# Patient Record
Sex: MALE | Race: WHITE | NOT HISPANIC OR LATINO | URBAN - METROPOLITAN AREA
[De-identification: names, ages, dates, MRNs, and addresses within clinical notes are randomized per-mention and may not be internally consistent; named-entity substitution may affect disease eponyms.]

---

## 2018-07-14 ENCOUNTER — INPATIENT (INPATIENT)
Facility: HOSPITAL | Age: 58
LOS: 2 days | Discharge: HOME | End: 2018-07-17
Attending: INTERNAL MEDICINE | Admitting: INTERNAL MEDICINE

## 2018-07-14 VITALS
HEART RATE: 103 BPM | TEMPERATURE: 98 F | DIASTOLIC BLOOD PRESSURE: 83 MMHG | OXYGEN SATURATION: 100 % | RESPIRATION RATE: 20 BRPM | SYSTOLIC BLOOD PRESSURE: 135 MMHG

## 2018-07-14 DIAGNOSIS — I10 ESSENTIAL (PRIMARY) HYPERTENSION: ICD-10-CM

## 2018-07-14 DIAGNOSIS — F10.11 ALCOHOL ABUSE, IN REMISSION: ICD-10-CM

## 2018-07-14 DIAGNOSIS — D50.9 IRON DEFICIENCY ANEMIA, UNSPECIFIED: ICD-10-CM

## 2018-07-14 DIAGNOSIS — I25.2 OLD MYOCARDIAL INFARCTION: ICD-10-CM

## 2018-07-14 DIAGNOSIS — K85.90 ACUTE PANCREATITIS WITHOUT NECROSIS OR INFECTION, UNSPECIFIED: ICD-10-CM

## 2018-07-14 DIAGNOSIS — I25.10 ATHEROSCLEROTIC HEART DISEASE OF NATIVE CORONARY ARTERY WITHOUT ANGINA PECTORIS: ICD-10-CM

## 2018-07-14 DIAGNOSIS — F17.210 NICOTINE DEPENDENCE, CIGARETTES, UNCOMPLICATED: ICD-10-CM

## 2018-07-14 DIAGNOSIS — E78.5 HYPERLIPIDEMIA, UNSPECIFIED: ICD-10-CM

## 2018-07-14 DIAGNOSIS — K86.3 PSEUDOCYST OF PANCREAS: ICD-10-CM

## 2018-07-14 DIAGNOSIS — Z95.5 PRESENCE OF CORONARY ANGIOPLASTY IMPLANT AND GRAFT: ICD-10-CM

## 2018-07-14 LAB
ALBUMIN SERPL ELPH-MCNC: 3.7 G/DL — SIGNIFICANT CHANGE UP (ref 3.5–5.2)
ALP SERPL-CCNC: 111 U/L — SIGNIFICANT CHANGE UP (ref 30–115)
ALT FLD-CCNC: 10 U/L — SIGNIFICANT CHANGE UP (ref 0–41)
ANION GAP SERPL CALC-SCNC: 18 MMOL/L — HIGH (ref 7–14)
AST SERPL-CCNC: 19 U/L — SIGNIFICANT CHANGE UP (ref 0–41)
BASOPHILS # BLD AUTO: 0.02 K/UL — SIGNIFICANT CHANGE UP (ref 0–0.2)
BASOPHILS NFR BLD AUTO: 0.2 % — SIGNIFICANT CHANGE UP (ref 0–1)
BILIRUB SERPL-MCNC: 0.8 MG/DL — SIGNIFICANT CHANGE UP (ref 0.2–1.2)
BUN SERPL-MCNC: 9 MG/DL — LOW (ref 10–20)
CALCIUM SERPL-MCNC: 9.2 MG/DL — SIGNIFICANT CHANGE UP (ref 8.5–10.1)
CHLORIDE SERPL-SCNC: 94 MMOL/L — LOW (ref 98–110)
CK SERPL-CCNC: 46 U/L — SIGNIFICANT CHANGE UP (ref 0–225)
CO2 SERPL-SCNC: 24 MMOL/L — SIGNIFICANT CHANGE UP (ref 17–32)
CREAT SERPL-MCNC: 0.6 MG/DL — LOW (ref 0.7–1.5)
EOSINOPHIL # BLD AUTO: 0.16 K/UL — SIGNIFICANT CHANGE UP (ref 0–0.7)
EOSINOPHIL NFR BLD AUTO: 1.9 % — SIGNIFICANT CHANGE UP (ref 0–8)
GLUCOSE SERPL-MCNC: 102 MG/DL — HIGH (ref 70–99)
HCT VFR BLD CALC: 30.1 % — LOW (ref 42–52)
HGB BLD-MCNC: 8.9 G/DL — LOW (ref 14–18)
IMM GRANULOCYTES NFR BLD AUTO: 0.6 % — HIGH (ref 0.1–0.3)
LACTATE SERPL-SCNC: 0.8 MMOL/L — SIGNIFICANT CHANGE UP (ref 0.5–2.2)
LIDOCAIN IGE QN: >600 U/L — HIGH (ref 7–60)
LYMPHOCYTES # BLD AUTO: 0.89 K/UL — LOW (ref 1.2–3.4)
LYMPHOCYTES # BLD AUTO: 10.4 % — LOW (ref 20.5–51.1)
MCHC RBC-ENTMCNC: 21.3 PG — LOW (ref 27–31)
MCHC RBC-ENTMCNC: 29.6 G/DL — LOW (ref 32–37)
MCV RBC AUTO: 72.2 FL — LOW (ref 80–94)
MONOCYTES # BLD AUTO: 1.02 K/UL — HIGH (ref 0.1–0.6)
MONOCYTES NFR BLD AUTO: 11.9 % — HIGH (ref 1.7–9.3)
NEUTROPHILS # BLD AUTO: 6.42 K/UL — SIGNIFICANT CHANGE UP (ref 1.4–6.5)
NEUTROPHILS NFR BLD AUTO: 75 % — SIGNIFICANT CHANGE UP (ref 42.2–75.2)
PLATELET # BLD AUTO: 293 K/UL — SIGNIFICANT CHANGE UP (ref 130–400)
POTASSIUM SERPL-MCNC: 4.5 MMOL/L — SIGNIFICANT CHANGE UP (ref 3.5–5)
POTASSIUM SERPL-SCNC: 4.5 MMOL/L — SIGNIFICANT CHANGE UP (ref 3.5–5)
PROT SERPL-MCNC: 6.7 G/DL — SIGNIFICANT CHANGE UP (ref 6–8)
RBC # BLD: 4.17 M/UL — LOW (ref 4.7–6.1)
RBC # FLD: 22.5 % — HIGH (ref 11.5–14.5)
SODIUM SERPL-SCNC: 136 MMOL/L — SIGNIFICANT CHANGE UP (ref 135–146)
TROPONIN T SERPL-MCNC: <0.01 NG/ML — SIGNIFICANT CHANGE UP
WBC # BLD: 8.56 K/UL — SIGNIFICANT CHANGE UP (ref 4.8–10.8)
WBC # FLD AUTO: 8.56 K/UL — SIGNIFICANT CHANGE UP (ref 4.8–10.8)

## 2018-07-14 RX ORDER — METHOCARBAMOL 500 MG/1
750 TABLET, FILM COATED ORAL ONCE
Qty: 0 | Refills: 0 | Status: COMPLETED | OUTPATIENT
Start: 2018-07-14 | End: 2018-07-14

## 2018-07-14 RX ORDER — MORPHINE SULFATE 50 MG/1
4 CAPSULE, EXTENDED RELEASE ORAL ONCE
Qty: 0 | Refills: 0 | Status: DISCONTINUED | OUTPATIENT
Start: 2018-07-14 | End: 2018-07-14

## 2018-07-14 RX ORDER — MORPHINE SULFATE 50 MG/1
6 CAPSULE, EXTENDED RELEASE ORAL ONCE
Qty: 0 | Refills: 0 | Status: DISCONTINUED | OUTPATIENT
Start: 2018-07-14 | End: 2018-07-14

## 2018-07-14 RX ORDER — ATORVASTATIN CALCIUM 80 MG/1
1 TABLET, FILM COATED ORAL
Qty: 0 | Refills: 0 | COMMUNITY

## 2018-07-14 RX ORDER — PIPERACILLIN AND TAZOBACTAM 4; .5 G/20ML; G/20ML
3.38 INJECTION, POWDER, LYOPHILIZED, FOR SOLUTION INTRAVENOUS ONCE
Qty: 0 | Refills: 0 | Status: COMPLETED | OUTPATIENT
Start: 2018-07-14 | End: 2018-07-14

## 2018-07-14 RX ORDER — SODIUM CHLORIDE 9 MG/ML
1000 INJECTION, SOLUTION INTRAVENOUS ONCE
Qty: 0 | Refills: 0 | Status: COMPLETED | OUTPATIENT
Start: 2018-07-14 | End: 2018-07-14

## 2018-07-14 RX ORDER — SODIUM CHLORIDE 9 MG/ML
1000 INJECTION, SOLUTION INTRAVENOUS
Qty: 0 | Refills: 0 | Status: DISCONTINUED | OUTPATIENT
Start: 2018-07-14 | End: 2018-07-15

## 2018-07-14 RX ADMIN — METHOCARBAMOL 750 MILLIGRAM(S): 500 TABLET, FILM COATED ORAL at 17:12

## 2018-07-14 RX ADMIN — SODIUM CHLORIDE 1000 MILLILITER(S): 9 INJECTION, SOLUTION INTRAVENOUS at 17:12

## 2018-07-14 RX ADMIN — MORPHINE SULFATE 4 MILLIGRAM(S): 50 CAPSULE, EXTENDED RELEASE ORAL at 17:12

## 2018-07-14 RX ADMIN — MORPHINE SULFATE 6 MILLIGRAM(S): 50 CAPSULE, EXTENDED RELEASE ORAL at 23:30

## 2018-07-14 RX ADMIN — PIPERACILLIN AND TAZOBACTAM 200 GRAM(S): 4; .5 INJECTION, POWDER, LYOPHILIZED, FOR SOLUTION INTRAVENOUS at 23:39

## 2018-07-14 RX ADMIN — MORPHINE SULFATE 4 MILLIGRAM(S): 50 CAPSULE, EXTENDED RELEASE ORAL at 23:40

## 2018-07-14 RX ADMIN — MORPHINE SULFATE 4 MILLIGRAM(S): 50 CAPSULE, EXTENDED RELEASE ORAL at 19:27

## 2018-07-14 NOTE — ED ADULT NURSE NOTE - CHIEF COMPLAINT QUOTE
Pt complaining of groin pain, abdominal pain,  back pain and shoulder pain. Pt states he was seen in [facility] in June and told he had a growth on pancreas and lung. Pt signed himself out and has not followed up since that time.

## 2018-07-14 NOTE — ED PROVIDER NOTE - PROGRESS NOTE DETAILS
s/o Dr. alcazar received signout from Dr. Morrissey - ct abdomen done- pending result; will continue to follow - anticipate admission for evaluation of elevated lipase/ pancreatic mass see at [facility]. case d/w with surgical resident Dr. Bruce - will see pt in Ed; RUQ ultrasound ordered, Gi consult placed. initial CT scan read, fat stranding and fluid around pancreas, multiple loculated abscesses.  pt given zosyn, surgery called to see pt.  seen by surgery, no acute intervention.  updated CT read now stating loculated fluid collections likely pseudocysts.  Repeat VS ok.  Case d/w ICU, Dr. Graham, states pt stable for floor at this time.

## 2018-07-14 NOTE — ED PROVIDER NOTE - MEDICAL DECISION MAKING DETAILS
pain persistent, elevated lipase, awaiting CT * see progress notes* pt in ER with pancreatitis, lactate ok, case d/w ICU, pt stable for benito.

## 2018-07-14 NOTE — ED ADULT NURSE NOTE - OBJECTIVE STATEMENT
pt c/o suprapubic, lower abdominal pain radiating to b/l groin/thighs x 4 days; as per pt pain worsened this morning, upon examination pt appears in most pain when RLQ palpated; pt has chronic back pain and difficulty lying flat. Pt recently diagnosed at [facility] with growth on pancreas and lung; pt has not followed up since.

## 2018-07-14 NOTE — ED PROVIDER NOTE - PHYSICAL EXAMINATION
CONSTITUTIONAL: Well-developed; well-nourished; in no acute distress.   SKIN: warm, dry  HEAD: Normocephalic; atraumatic.  EYES: PERRL, no conjunctival erythema  ENT: No nasal discharge; airway clear.  NECK: Supple; non tender.  CARD: S1, S2 normal; Regular rate and rhythm.   RESP: No wheezes, rales or rhonchi.  ABD: soft non distended, + tenderness in RLQ, no rebound or guarding.   : normal genital exam, no edema, errythema or tenderness. no hernia.   EXT: Normal ROM.   LYMPH: No acute cervical adenopathy.  NEURO: Alert, oriented, grossly unremarkable, 5/5 strength in all 4 extremities, equal sensation bilaterally

## 2018-07-14 NOTE — ED ADULT TRIAGE NOTE - CHIEF COMPLAINT QUOTE
Pt complaining of groin pain, abdominal pain,  back pain and shoulder pain Pt complaining of groin pain, abdominal pain,  back pain and shoulder pain. Pt states he was seen in [facility] in June and told he had a growth on pancreas and lung. Pt signed himself out and has not followed up since that time.

## 2018-07-14 NOTE — ED PROVIDER NOTE - OBJECTIVE STATEMENT
59 yo M pmh of recent MI one month ago with stent placement. ALso recently diagnosed with pancreatic and lung mass that have not been fully worked up. Was admitted to look into in at [facility] but signed out ama when he did not walk to wait an additional day for an MRI. States that since he continues to have abdominal and groin pain. Today he started having worse pain and now is unable to lie down flat. no cp, no sob, no n/v/d.

## 2018-07-14 NOTE — CONSULT NOTE ADULT - ASSESSMENT
A: 59 yo M pmh of HLD, HTN, alcohol abuse in the past, chronic pancreatitis and recent MI one month ago with stent placement. Also recently diagnosed with pancreatic and lung mass that have not been fully worked up. Was admitted to look into in at [facility] but signed out ama when he did not want to wait an additional day for an MRI. States that since then he continues to have abdominal. Today he started having worse pain that is constant and now is unable to lie down flat, no fever or chills. +nausea, no vomiting, no diarhea/constipation, endorses a recent 38 lb loss in the past month.     Plan:  -IV fluids  -pain control  -GI follow up A: 59 yo M pmh of HLD, HTN, alcohol abuse in the past, chronic pancreatitis and recent MI one month ago with stent placement. Also recently diagnosed with pancreatic and lung mass that have not been fully worked up. Was admitted to look into in at [facility] but signed out ama when he did not want to wait an additional day for an MRI. States that since then he continues to have abdominal. Today he started having worse pain that is constant and now is unable to lie down flat, no fever or chills. +nausea, no vomiting, no diarhea/constipation, endorses a recent 38 lb loss in the past month.     Plan:  -IV fluids  -pain control  -GI follow up  -f/u US abdomen 59 yo M with chronic pancreatitis, with likely multiple small pseudocysts     Plan:  -npo IV fluids  -pain control  - ct pancreatic protocol/ mrcp  -GI follow up for eus aspiration   -f/u US abdomen  - no role for acute surgical intervention   - will follow

## 2018-07-14 NOTE — ED PROVIDER NOTE - NS ED ROS FT
Eyes:  No visual changes, eye pain or discharge.  ENMT:   no sore throat or runny nose,   Cardiac:  No chest pain, SOB. recent cath procedure.   Respiratory:  No cough or respiratory distress.   GI:  No nausea, vomiting, diarrhea. + diffuse abdominal pain for few weeks that has worsened today.   :  No dysuria, frequency or burning.  MS:  No joint pain or back pain.  Neuro:  No headache or weakness.    Skin:  No skin rash.   Endocrine: No history of thyroid disease or diabetes.

## 2018-07-14 NOTE — ED PROVIDER NOTE - ATTENDING CONTRIBUTION TO CARE
58 M to ED with abd pain, pancreatic and lungmass recently diagnosed but left AMA before MRI done inpt at prev admission.     today pain persistent and unable to sit back so to ED for eval.   AVSS, exam as noted, CTAB, RRR, abdomen soft diffusely tender, (+) bowel sounds, neuro nonfocal

## 2018-07-14 NOTE — CONSULT NOTE ADULT - SUBJECTIVE AND OBJECTIVE BOX
59 yo M pmh of HLD, HTN, alcohol abuse in the past( states quit 2 years ago)chronic pancreatitis and recent MI one month ago with stent placement. Also recently diagnosed with pancreatic and lung mass that have not been fully worked up. Was admitted to look into in at [facility] but signed out ama when he did not want to wait an additional day for an MRI. States that since then he continues to have abdominal and groin pain. Today he started having worse pain that is constant and now is unable to lie down flat, pain doesn't radiate. No cp, no sob, no fever or chills. +nausea, no vomiting, no diarhea/constipation, endorses a recent 38 lb loss in the past month.     PE:    Vital Signs Last 24 Hrs  T(C): 36.8 (14 Jul 2018 22:22), Max: 36.9 (14 Jul 2018 15:03)  T(F): 98.2 (14 Jul 2018 22:22), Max: 98.4 (14 Jul 2018 15:03)  HR: 89 (14 Jul 2018 22:22) (89 - 103)  BP: 133/83 (14 Jul 2018 22:22) (133/83 - 135/83)  BP(mean): 104 (14 Jul 2018 22:22) (104 - 104)  RR: 20 (14 Jul 2018 22:22) (20 - 20)  SpO2: 100% (14 Jul 2018 22:22) (100% - 100%)    Labs;  Lipase, Serum (07.14.18 @ 16:01)    Lipase, Serum: >600 U/L  Lactate, Blood (07.14.18 @ 16:01)    Lactate, Blood: 0.8 mmol/L    Comprehensive Metabolic Panel (07.14.18 @ 16:01)    Sodium, Serum: 136 mmol/L    Potassium, Serum: 4.5 mmol/L    Chloride, Serum: 94 mmol/L    Carbon Dioxide, Serum: 24 mmol/L    Anion Gap, Serum: 18 mmol/L    Blood Urea Nitrogen, Serum: 9 mg/dL    Creatinine, Serum: 0.6 mg/dL    Glucose, Serum: 102 mg/dL    Calcium, Total Serum: 9.2 mg/dL    Protein Total, Serum: 6.7 g/dL    Albumin, Serum: 3.7 g/dL    Bilirubin Total, Serum: 0.8 mg/dL    Alkaline Phosphatase, Serum: 111 U/L    Aspartate Aminotransferase (AST/SGOT): 19 U/L    Alanine Aminotransferase (ALT/SGPT): 10 U/L     Creatine Kinase, Serum (07.14.18 @ 16:01)    Creatine Kinase, Serum: 46 U/L    Troponin T, Serum (07.14.18 @ 16:01)    Troponin T, Serum: <0.01 ng/mL    Complete Blood Count + Automated Diff (07.14.18 @ 16:01)    WBC Count: 8.56 K/uL    RBC Count: 4.17 M/uL    Hemoglobin: 8.9 g/dL    Hematocrit: 30.1 %    Mean Cell Volume: 72.2 fL    Mean Cell Hemoglobin: 21.3 pg    Mean Cell Hemoglobin Conc: 29.6 g/dL    Red Cell Distrib Width: 22.5 %    Platelet Count - Automated: 293 K/uL    Auto Neutrophil #: 6.42 K/uL    Auto Lymphocyte #: 0.89 K/uL    Auto Monocyte #: 1.02 K/uL    Auto Eosinophil #: 0.16 K/uL    Auto Basophil #: 0.02 K/uL    Auto Neutrophil %: 75.0: Differential percentages must be correlated with absolute numbers for  clinical significance. %    Auto Lymphocyte %: 10.4 %    Auto Monocyte %: 11.9 %    Auto Eosinophil %: 1.9 %    Auto Basophil %: 0.2 %    Auto Immature Granulocyte %: 0.6 %    Imaging:  < from: CT Abdomen and Pelvis w/ IV Cont (07.14.18 @ 19:50) >  IMPRESSION:        Extensive peripancreatic fat stranding and surrounding fluid, compatible   with pancreatitis and multiple loculated collections likely reflecting   pseudocysts. Superimposed infection is difficult to exclude.    Mild abdominopelvic ascites.    < end of copied text >        Imaging: NICOLA RECIO [id number]  58y Male    HPI:  59 yo M pmh of HLD, HTN, alcohol abuse in the past( states quit 2 years ago)chronic pancreatitis and recent MI one month ago with stent placement. Also recently diagnosed with pancreatic and lung mass that have not been fully worked up. Was admitted to look into in at [facility] but signed out ama when he did not want to wait an additional day for an MRI. States that since then he continues to have abdominal and groin pain. Today he started having worse pain that is constant and now is unable to lie down flat, pain doesn't radiate. No cp, no sob, no fever or chills. +nausea, no vomiting, no diarhea/constipation, endorses a recent 38 lb loss in the past month.     PAST MEDICAL & SURGICAL HISTORY:  High cholesterol  Hypertension  Lung mass  Pancreatic mass  Myocardial infarction acute        MEDICATIONS  (STANDING):  lactated ringers. 1000 milliLiter(s) (100 mL/Hr) IV Continuous <Continuous>    MEDICATIONS  (PRN):      Allergies    No Known Allergies    Intolerances        REVIEW OF SYSTEMS    [ ] A ten-point review of systems was otherwise negative except as noted.      Vital Signs Last 24 Hrs  T(C): 36.6 (14 Jul 2018 23:56), Max: 36.9 (14 Jul 2018 15:03)  T(F): 97.9 (14 Jul 2018 23:56), Max: 98.4 (14 Jul 2018 15:03)  HR: 92 (14 Jul 2018 23:56) (89 - 103)  BP: 112/71 (14 Jul 2018 23:56) (112/71 - 135/83)  BP(mean): 104 (14 Jul 2018 22:22) (104 - 104)  RR: 18 (14 Jul 2018 23:56) (18 - 20)  SpO2: 98% (14 Jul 2018 23:56) (98% - 100%)    PHYSICAL EXAM:  GENERAL: NAD, well-appearing  CHEST/LUNG: Clear to auscultation bilaterally  HEART: Regular rate and rhythm  ABDOMEN: Soft, Nontender, Nondistended;   EXTREMITIES:  No clubbing, cyanosis, or edema      LABS:  Labs:  CAPILLARY BLOOD GLUCOSE                              8.9    8.56  )-----------( 293      ( 14 Jul 2018 16:01 )             30.1       Auto Neutrophil %: 75.0 % (07-14-18 @ 16:01)  Auto Immature Granulocyte %: 0.6 % (07-14-18 @ 16:01)    07-14    136  |  94<L>  |  9<L>  ----------------------------<  102<H>  4.5   |  24  |  0.6<L>      Calcium, Total Serum: 9.2 mg/dL (07-14-18 @ 16:01)      LFTs:             6.7  | 0.8  | 19       ------------------[111     ( 14 Jul 2018 16:01 )  3.7  | x    | 10          Lipase:>600   Amylase:x         Lactate, Blood: 0.8 mmol/L (07-14-18 @ 16:01)      Coags:    CARDIAC MARKERS ( 14 Jul 2018 16:01 )  x     / <0.01 ng/mL / 46 U/L / x     / x                  RADIOLOGY & ADDITIONAL STUDIES:    Imaging:  < from: CT Abdomen and Pelvis w/ IV Cont (07.14.18 @ 19:50) >  IMPRESSION:        Extensive peripancreatic fat stranding and surrounding fluid, compatible   with pancreatitis and multiple loculated collections likely reflecting   pseudocysts. Superimposed infection is difficult to exclude.    Mild abdominopelvic ascites.    < end of copied text >        Imaging:

## 2018-07-15 LAB
ALBUMIN SERPL ELPH-MCNC: 2.8 G/DL — LOW (ref 3.5–5.2)
ALBUMIN SERPL ELPH-MCNC: 3.1 G/DL — LOW (ref 3.5–5.2)
ALBUMIN SERPL ELPH-MCNC: 3.3 G/DL — LOW (ref 3.5–5.2)
ALP SERPL-CCNC: 88 U/L — SIGNIFICANT CHANGE UP (ref 30–115)
ALP SERPL-CCNC: 90 U/L — SIGNIFICANT CHANGE UP (ref 30–115)
ALP SERPL-CCNC: 94 U/L — SIGNIFICANT CHANGE UP (ref 30–115)
ALT FLD-CCNC: 7 U/L — SIGNIFICANT CHANGE UP (ref 0–41)
AMYLASE P1 CFR SERPL: 616 U/L — CRITICAL HIGH (ref 25–115)
ANION GAP SERPL CALC-SCNC: 17 MMOL/L — HIGH (ref 7–14)
ANION GAP SERPL CALC-SCNC: 17 MMOL/L — HIGH (ref 7–14)
APPEARANCE UR: CLEAR — SIGNIFICANT CHANGE UP
AST SERPL-CCNC: 13 U/L — SIGNIFICANT CHANGE UP (ref 0–41)
AST SERPL-CCNC: 13 U/L — SIGNIFICANT CHANGE UP (ref 0–41)
AST SERPL-CCNC: 15 U/L — SIGNIFICANT CHANGE UP (ref 0–41)
BASOPHILS # BLD AUTO: 0.04 K/UL — SIGNIFICANT CHANGE UP (ref 0–0.2)
BASOPHILS NFR BLD AUTO: 0.5 % — SIGNIFICANT CHANGE UP (ref 0–1)
BILIRUB DIRECT SERPL-MCNC: 0.2 MG/DL — SIGNIFICANT CHANGE UP (ref 0–0.2)
BILIRUB DIRECT SERPL-MCNC: 0.2 MG/DL — SIGNIFICANT CHANGE UP (ref 0–0.2)
BILIRUB INDIRECT FLD-MCNC: 0.3 MG/DL — SIGNIFICANT CHANGE UP (ref 0.2–1.2)
BILIRUB INDIRECT FLD-MCNC: 0.4 MG/DL — SIGNIFICANT CHANGE UP (ref 0.2–1.2)
BILIRUB SERPL-MCNC: 0.5 MG/DL — SIGNIFICANT CHANGE UP (ref 0.2–1.2)
BILIRUB SERPL-MCNC: 0.6 MG/DL — SIGNIFICANT CHANGE UP (ref 0.2–1.2)
BILIRUB SERPL-MCNC: 0.6 MG/DL — SIGNIFICANT CHANGE UP (ref 0.2–1.2)
BILIRUB UR-MCNC: NEGATIVE — SIGNIFICANT CHANGE UP
BUN SERPL-MCNC: 9 MG/DL — LOW (ref 10–20)
BUN SERPL-MCNC: 9 MG/DL — LOW (ref 10–20)
CALCIUM SERPL-MCNC: 8.3 MG/DL — LOW (ref 8.5–10.1)
CALCIUM SERPL-MCNC: 8.5 MG/DL — SIGNIFICANT CHANGE UP (ref 8.5–10.1)
CHLORIDE SERPL-SCNC: 94 MMOL/L — LOW (ref 98–110)
CHLORIDE SERPL-SCNC: 94 MMOL/L — LOW (ref 98–110)
CO2 SERPL-SCNC: 23 MMOL/L — SIGNIFICANT CHANGE UP (ref 17–32)
CO2 SERPL-SCNC: 23 MMOL/L — SIGNIFICANT CHANGE UP (ref 17–32)
COLOR SPEC: YELLOW — SIGNIFICANT CHANGE UP
CREAT SERPL-MCNC: 0.5 MG/DL — LOW (ref 0.7–1.5)
CREAT SERPL-MCNC: <0.5 MG/DL — LOW (ref 0.7–1.5)
DIFF PNL FLD: NEGATIVE — SIGNIFICANT CHANGE UP
EOSINOPHIL # BLD AUTO: 0.37 K/UL — SIGNIFICANT CHANGE UP (ref 0–0.7)
EOSINOPHIL NFR BLD AUTO: 4.8 % — SIGNIFICANT CHANGE UP (ref 0–8)
ETHANOL SERPL-MCNC: <10 MG/DL — HIGH
GLUCOSE SERPL-MCNC: 68 MG/DL — LOW (ref 70–99)
GLUCOSE SERPL-MCNC: 70 MG/DL — SIGNIFICANT CHANGE UP (ref 70–99)
GLUCOSE UR QL: NEGATIVE — SIGNIFICANT CHANGE UP
HCT VFR BLD CALC: 28.1 % — LOW (ref 42–52)
HGB BLD-MCNC: 8.3 G/DL — LOW (ref 14–18)
IMM GRANULOCYTES NFR BLD AUTO: 0.4 % — HIGH (ref 0.1–0.3)
IRON SATN MFR SERPL: 15 UG/DL — LOW (ref 35–150)
IRON SATN MFR SERPL: 5 % — LOW (ref 15–50)
KETONES UR-MCNC: 40
LEUKOCYTE ESTERASE UR-ACNC: NEGATIVE — SIGNIFICANT CHANGE UP
LIDOCAIN IGE QN: 446 U/L — HIGH (ref 7–60)
LYMPHOCYTES # BLD AUTO: 0.66 K/UL — LOW (ref 1.2–3.4)
LYMPHOCYTES # BLD AUTO: 8.5 % — LOW (ref 20.5–51.1)
MCHC RBC-ENTMCNC: 21.6 PG — LOW (ref 27–31)
MCHC RBC-ENTMCNC: 29.5 G/DL — LOW (ref 32–37)
MCV RBC AUTO: 73 FL — LOW (ref 80–94)
MONOCYTES # BLD AUTO: 0.87 K/UL — HIGH (ref 0.1–0.6)
MONOCYTES NFR BLD AUTO: 11.3 % — HIGH (ref 1.7–9.3)
NEUTROPHILS # BLD AUTO: 5.75 K/UL — SIGNIFICANT CHANGE UP (ref 1.4–6.5)
NEUTROPHILS NFR BLD AUTO: 74.5 % — SIGNIFICANT CHANGE UP (ref 42.2–75.2)
NITRITE UR-MCNC: NEGATIVE — SIGNIFICANT CHANGE UP
PH UR: 6.5 — SIGNIFICANT CHANGE UP (ref 5–8)
PLATELET # BLD AUTO: 282 K/UL — SIGNIFICANT CHANGE UP (ref 130–400)
POTASSIUM SERPL-MCNC: 3.8 MMOL/L — SIGNIFICANT CHANGE UP (ref 3.5–5)
POTASSIUM SERPL-MCNC: 4 MMOL/L — SIGNIFICANT CHANGE UP (ref 3.5–5)
POTASSIUM SERPL-SCNC: 3.8 MMOL/L — SIGNIFICANT CHANGE UP (ref 3.5–5)
POTASSIUM SERPL-SCNC: 4 MMOL/L — SIGNIFICANT CHANGE UP (ref 3.5–5)
PROT SERPL-MCNC: 5.2 G/DL — LOW (ref 6–8)
PROT SERPL-MCNC: 5.4 G/DL — LOW (ref 6–8)
PROT SERPL-MCNC: 5.6 G/DL — LOW (ref 6–8)
PROT UR-MCNC: 30
RBC # BLD: 3.85 M/UL — LOW (ref 4.7–6.1)
RBC # FLD: 22.1 % — HIGH (ref 11.5–14.5)
SODIUM SERPL-SCNC: 134 MMOL/L — LOW (ref 135–146)
SODIUM SERPL-SCNC: 134 MMOL/L — LOW (ref 135–146)
SP GR SPEC: >=1.03 — SIGNIFICANT CHANGE UP (ref 1.01–1.03)
TIBC SERPL-MCNC: 291 UG/DL — SIGNIFICANT CHANGE UP (ref 220–430)
TRANSFERRIN SERPL-MCNC: 252 MG/DL — SIGNIFICANT CHANGE UP (ref 200–360)
TRIGL SERPL-MCNC: 77 MG/DL — SIGNIFICANT CHANGE UP (ref 10–149)
TYPE + AB SCN PNL BLD: SIGNIFICANT CHANGE UP
UIBC SERPL-MCNC: 276 UG/DL — SIGNIFICANT CHANGE UP (ref 110–370)
UROBILINOGEN FLD QL: 1 (ref 0.2–0.2)
WBC # BLD: 7.72 K/UL — SIGNIFICANT CHANGE UP (ref 4.8–10.8)
WBC # FLD AUTO: 7.72 K/UL — SIGNIFICANT CHANGE UP (ref 4.8–10.8)

## 2018-07-15 RX ORDER — ATORVASTATIN CALCIUM 80 MG/1
80 TABLET, FILM COATED ORAL AT BEDTIME
Qty: 0 | Refills: 0 | Status: DISCONTINUED | OUTPATIENT
Start: 2018-07-15 | End: 2018-07-15

## 2018-07-15 RX ORDER — METOPROLOL TARTRATE 50 MG
50 TABLET ORAL DAILY
Qty: 0 | Refills: 0 | Status: DISCONTINUED | OUTPATIENT
Start: 2018-07-15 | End: 2018-07-17

## 2018-07-15 RX ORDER — TICAGRELOR 90 MG/1
90 TABLET ORAL
Qty: 0 | Refills: 0 | Status: DISCONTINUED | OUTPATIENT
Start: 2018-07-15 | End: 2018-07-17

## 2018-07-15 RX ORDER — PIPERACILLIN AND TAZOBACTAM 4; .5 G/20ML; G/20ML
3.38 INJECTION, POWDER, LYOPHILIZED, FOR SOLUTION INTRAVENOUS EVERY 8 HOURS
Qty: 0 | Refills: 0 | Status: DISCONTINUED | OUTPATIENT
Start: 2018-07-15 | End: 2018-07-16

## 2018-07-15 RX ORDER — CEFEPIME 1 G/1
2000 INJECTION, POWDER, FOR SOLUTION INTRAMUSCULAR; INTRAVENOUS ONCE
Qty: 0 | Refills: 0 | Status: DISCONTINUED | OUTPATIENT
Start: 2018-07-15 | End: 2018-07-15

## 2018-07-15 RX ORDER — SODIUM CHLORIDE 9 MG/ML
1000 INJECTION, SOLUTION INTRAVENOUS
Qty: 0 | Refills: 0 | Status: DISCONTINUED | OUTPATIENT
Start: 2018-07-15 | End: 2018-07-15

## 2018-07-15 RX ORDER — HEPARIN SODIUM 5000 [USP'U]/ML
5000 INJECTION INTRAVENOUS; SUBCUTANEOUS EVERY 8 HOURS
Qty: 0 | Refills: 0 | Status: DISCONTINUED | OUTPATIENT
Start: 2018-07-15 | End: 2018-07-17

## 2018-07-15 RX ORDER — MORPHINE SULFATE 50 MG/1
4 CAPSULE, EXTENDED RELEASE ORAL EVERY 4 HOURS
Qty: 0 | Refills: 0 | Status: DISCONTINUED | OUTPATIENT
Start: 2018-07-15 | End: 2018-07-17

## 2018-07-15 RX ORDER — CEFEPIME 1 G/1
INJECTION, POWDER, FOR SOLUTION INTRAMUSCULAR; INTRAVENOUS
Qty: 0 | Refills: 0 | Status: DISCONTINUED | OUTPATIENT
Start: 2018-07-15 | End: 2018-07-15

## 2018-07-15 RX ORDER — CEFEPIME 1 G/1
2000 INJECTION, POWDER, FOR SOLUTION INTRAMUSCULAR; INTRAVENOUS EVERY 8 HOURS
Qty: 0 | Refills: 0 | Status: DISCONTINUED | OUTPATIENT
Start: 2018-07-15 | End: 2018-07-15

## 2018-07-15 RX ORDER — SODIUM CHLORIDE 9 MG/ML
1000 INJECTION, SOLUTION INTRAVENOUS
Qty: 0 | Refills: 0 | Status: DISCONTINUED | OUTPATIENT
Start: 2018-07-15 | End: 2018-07-17

## 2018-07-15 RX ADMIN — SODIUM CHLORIDE 200 MILLILITER(S): 9 INJECTION, SOLUTION INTRAVENOUS at 17:31

## 2018-07-15 RX ADMIN — MORPHINE SULFATE 6 MILLIGRAM(S): 50 CAPSULE, EXTENDED RELEASE ORAL at 02:58

## 2018-07-15 RX ADMIN — TICAGRELOR 90 MILLIGRAM(S): 90 TABLET ORAL at 06:59

## 2018-07-15 RX ADMIN — PIPERACILLIN AND TAZOBACTAM 25 GRAM(S): 4; .5 INJECTION, POWDER, LYOPHILIZED, FOR SOLUTION INTRAVENOUS at 13:23

## 2018-07-15 RX ADMIN — PIPERACILLIN AND TAZOBACTAM 25 GRAM(S): 4; .5 INJECTION, POWDER, LYOPHILIZED, FOR SOLUTION INTRAVENOUS at 07:01

## 2018-07-15 RX ADMIN — MORPHINE SULFATE 4 MILLIGRAM(S): 50 CAPSULE, EXTENDED RELEASE ORAL at 04:43

## 2018-07-15 RX ADMIN — Medication 50 MILLIGRAM(S): at 07:01

## 2018-07-15 RX ADMIN — SODIUM CHLORIDE 200 MILLILITER(S): 9 INJECTION, SOLUTION INTRAVENOUS at 04:43

## 2018-07-15 RX ADMIN — SODIUM CHLORIDE 200 MILLILITER(S): 9 INJECTION, SOLUTION INTRAVENOUS at 07:46

## 2018-07-15 RX ADMIN — PIPERACILLIN AND TAZOBACTAM 25 GRAM(S): 4; .5 INJECTION, POWDER, LYOPHILIZED, FOR SOLUTION INTRAVENOUS at 22:11

## 2018-07-15 RX ADMIN — SODIUM CHLORIDE 200 MILLILITER(S): 9 INJECTION, SOLUTION INTRAVENOUS at 11:19

## 2018-07-15 RX ADMIN — MORPHINE SULFATE 4 MILLIGRAM(S): 50 CAPSULE, EXTENDED RELEASE ORAL at 06:16

## 2018-07-15 RX ADMIN — SODIUM CHLORIDE 75 MILLILITER(S): 9 INJECTION, SOLUTION INTRAVENOUS at 12:47

## 2018-07-15 RX ADMIN — Medication 2.5 MILLIGRAM(S): at 06:59

## 2018-07-15 RX ADMIN — TICAGRELOR 90 MILLIGRAM(S): 90 TABLET ORAL at 22:54

## 2018-07-15 RX ADMIN — MORPHINE SULFATE 4 MILLIGRAM(S): 50 CAPSULE, EXTENDED RELEASE ORAL at 11:20

## 2018-07-15 NOTE — CHART NOTE - NSCHARTNOTEFT_GEN_A_CORE
Serial Abdominal Exam    Patient continues to feel uncomfortable, with 8/10 pain without morphine. Examined prior to morphine administration. Voluntary guarding and tenderness to palpation most at LLQ, but diffusely tender. He complains primarily of infraumbilical pain which is ameliorated by pain med administration. Otherwise no complaints.    Will continue to follow.    -f/u repeat LFTs  -on D5NS

## 2018-07-15 NOTE — H&P ADULT - NSHPLABSRESULTS_GEN_ALL_CORE
8.9    8.56  )-----------( 293      ( 14 Jul 2018 16:01 )             30.1       07-14    136  |  94<L>  |  9<L>  ----------------------------<  102<H>  4.5   |  24  |  0.6<L>    Ca    9.2      14 Jul 2018 16:01    TPro  6.7  /  Alb  3.7  /  TBili  0.8  /  DBili  x   /  AST  19  /  ALT  10  /  AlkPhos  111  07-14              Urinalysis Basic - ( 15 Jul 2018 00:50 )    Color: Yellow / Appearance: Clear / SG: >=1.030 / pH: x  Gluc: x / Ketone: 40  / Bili: Negative / Urobili: 1.0   Blood: x / Protein: 30 / Nitrite: Negative   Leuk Esterase: Negative / RBC: x / WBC x   Sq Epi: x / Non Sq Epi: x / Bacteria: x            Lactate Trend  07-14 @ 16:01 Lactate:0.8       CARDIAC MARKERS ( 14 Jul 2018 16:01 )  x     / <0.01 ng/mL / 46 U/L / x     / x 8.9    8.56  )-----------( 293      ( 14 Jul 2018 16:01 )             30.1       07-14    136  |  94<L>  |  9<L>  ----------------------------<  102<H>  4.5   |  24  |  0.6<L>    Ca    9.2      14 Jul 2018 16:01    TPro  6.7  /  Alb  3.7  /  TBili  0.8  /  DBili  x   /  AST  19  /  ALT  10  /  AlkPhos  111  07-14  lipase 465---250              Urinalysis Basic - ( 15 Jul 2018 00:50 )    Color: Yellow / Appearance: Clear / SG: >=1.030 / pH: x  Gluc: x / Ketone: 40  / Bili: Negative / Urobili: 1.0   Blood: x / Protein: 30 / Nitrite: Negative   Leuk Esterase: Negative / RBC: x / WBC x   Sq Epi: x / Non Sq Epi: x / Bacteria: x      Lactate Trend  07-14 @ 16:01 Lactate:0.8       CARDIAC MARKERS ( 14 Jul 2018 16:01 )  x     / <0.01 ng/mL / 46 U/L / x     / x

## 2018-07-15 NOTE — CONSULT NOTE ADULT - SUBJECTIVE AND OBJECTIVE BOX
GI HPI:  Patient is a 58y old  Male who presents with a chief complaint of abdominal pain (15 Jul 2018 01:27)    Hospital course:  59 yo M pmh of HLD, HTN, alcohol abuse in the past( states quit several years ago), recurrent alcoholic pancreatitis  recent MI presented with acute onset abdominal pain of one day duration. Pt reports having pain in lower abdomen and groin, 10/10, sharp , radiating to back . He states that he had an MI 1 month ago with stent placement, 1 week following MI he was nauseous , went to [facility] ,was found to have ? pancreatic mass and recommended to have MRI but signed out ama.   Denies fever/chills/nausea/vomiting/diarrhea/urinary symptoms. ROS otherwise negative for chest pain/sob/palpitations/headache/vision change/weakness/focal deficits. (15 Jul 2018 01:27)      PAST MEDICAL & SURGICAL HISTORY  High cholesterol  Hypertension  Lung mass  Pancreatic mass  Myocardial infarction acute      FAMILY HISTORY:  FAMILY HISTORY:      SOCIAL HISTORY:  smoker:   Alcohol:  Drug:    ALLERGIES:  No Known Allergies      MEDICATIONS:  MEDICATIONS  (STANDING):  enalapril 2.5 milliGRAM(s) Oral daily  heparin  Injectable 5000 Unit(s) SubCutaneous every 8 hours  lactated ringers. 1000 milliLiter(s) (200 mL/Hr) IV Continuous <Continuous>  metoprolol succinate ER 50 milliGRAM(s) Oral daily  piperacillin/tazobactam IVPB. 3.375 Gram(s) IV Intermittent every 8 hours  ticagrelor 90 milliGRAM(s) Oral two times a day    MEDICATIONS  (PRN):  morphine  - Injectable 4 milliGRAM(s) IV Push every 4 hours PRN Moderate Pain (4 - 6)      HOME MEDICATIONS:  Home Medications:  atorvastatin 80 mg oral tablet: 1 tab(s) orally once a day (14 Jul 2018 16:46)  Brilinta (ticagrelor) 90 mg oral tablet: 1 tab(s) orally 2 times a day (14 Jul 2018 16:46)  enalapril 2.5 mg oral tablet: 1 tab(s) orally once a day (14 Jul 2018 16:46)  Metoprolol Succinate ER 50 mg oral tablet, extended release: 1 tab(s) orally once a day (14 Jul 2018 16:46)      ROS:     REVIEW OF SYSTEMS:    CONSTITUTIONAL: No weakness, fatigue, malaise, fevers or chills, no weight change, appetite change  Throat: No Dysphagia, No Odynophagia  RESPIRATORY: No SOB  CARDIOVASCULAR: No chest pain   Muscloskeletal: no joints pain  NEUROLOGICAL: No syncope or diziness  SKIN: No pruritis  Heme/Lymph: no LN enlargement           VITALS:   T(F): 97.9 (07-14 @ 23:56), Max: 98.4 (07-14 @ 15:03)  HR: 92 (07-14 @ 23:56) (89 - 103)  BP: 112/71 (07-14 @ 23:56) (112/71 - 135/83)  BP(mean): 104 (07-14 @ 22:22) (104 - 104)  RR: 18 (07-14 @ 23:56) (18 - 20)  SpO2: 98% (07-14 @ 23:56) (98% - 100%)    I&O's Summary      PHYSICAL EXAM:  EYES: No scleral icterus   LUNG: Clear to auscultation bilaterally; No rales, rhonchi, wheezing, or rubs  HEART: RRR; No murmurs  ABDOMEN: Soft, +BS, Abdominal Tenderness, No guarding, No Jiménez Sign   Rectal Exam:     LABS:                        8.9    8.56  )-----------( 293      ( 14 Jul 2018 16:01 )             30.1       LIVER FUNCTIONS - ( 14 Jul 2018 16:01 )  Alb: 3.7 g/dL / Pro: 6.7 g/dL / ALK PHOS: 111 U/L / ALT: 10 U/L / AST: 19 U/L / GGT: x           07-14    136  |  94<L>  |  9<L>  ----------------------------<  102<H>  4.5   |  24  |  0.6<L>    Ca    9.2      14 Jul 2018 16:01      CARDIAC MARKERS ( 14 Jul 2018 16:01 )  x     / <0.01 ng/mL / 46 U/L / x     / x              Previous EGD:    Previous colonoscopy:       RADIOLOGY:    < from: US Abdomen Limited (07.15.18 @ 00:09) >  Minimal gallbladder wall thickening, with no additional sonographic   evidence of acute cholecystitis. No intra or extrahepatic biliary ductal   dilatation.    3.4 cm rounded hypodensity abutting the caudate lobelikely representing   pseudocyst as seen on concurrent CT.    < end of copied text >  < from: CT Abdomen and Pelvis w/ IV Cont (07.14.18 @ 19:50) >  PANCREAS: Significant peripancreatic fat stranding, with adjacent   multiple loculated collections, measuring 2.8 x 2.9 x 2.7 cm (series 5   image 143), 3.2 x 1.8 x 4 cm (image 186), 3.7 x 1.8 x 4 cm (image 182),   1.8 x 1 x 6 cm (series 5 image 236, series 6 image 22). The largest   collection is noted coursing inferiorly along the anterior aspect of the   IVC and measures about 12 x 2 x 7.5 cm. Another collection is noted in   the region of the caudate lobe of the liver. It measures up to 2.8 cm.    No discrete mass or areas of parenchymal necrosis is identified. Patent   splenic vein.    < end of copied text > GI HPI:  Patient is a 58y old  Male who presents with a chief complaint of abdominal pain (15 Jul 2018 01:27)    Hospital course:  57 yo M pmh of HLD, HTN, alcohol abuse in the past( states quit several years ago), recurrent alcoholic pancreatitis  recent MI presented with acute onset abdominal pain of one day duration. Pt reports having pain in lower abdomen and groin, 10/10, sharp , radiating to back . He states that he had an MI 1 month ago with stent placement, 1 week following MI he was nauseous , went to [facility] ,was found to have ? pancreatic mass and recommended to have MRI but signed out ama.   Denies fever/chills/nausea/vomiting/diarrhea/urinary symptoms. ROS otherwise negative for chest pain/sob/palpitations/headache/vision change/weakness/focal deficits. (15 Jul 2018 01:27)      PAST MEDICAL & SURGICAL HISTORY  High cholesterol  Hypertension  Lung mass  Pancreatic mass  Myocardial infarction acute      FAMILY HISTORY:  FAMILY HISTORY:      SOCIAL HISTORY:  smoker:   Alcohol:  Drug:    ALLERGIES:  No Known Allergies      MEDICATIONS:  MEDICATIONS  (STANDING):  enalapril 2.5 milliGRAM(s) Oral daily  heparin  Injectable 5000 Unit(s) SubCutaneous every 8 hours  lactated ringers. 1000 milliLiter(s) (200 mL/Hr) IV Continuous <Continuous>  metoprolol succinate ER 50 milliGRAM(s) Oral daily  piperacillin/tazobactam IVPB. 3.375 Gram(s) IV Intermittent every 8 hours  ticagrelor 90 milliGRAM(s) Oral two times a day    MEDICATIONS  (PRN):  morphine  - Injectable 4 milliGRAM(s) IV Push every 4 hours PRN Moderate Pain (4 - 6)      HOME MEDICATIONS:  Home Medications:  atorvastatin 80 mg oral tablet: 1 tab(s) orally once a day (14 Jul 2018 16:46)  Brilinta (ticagrelor) 90 mg oral tablet: 1 tab(s) orally 2 times a day (14 Jul 2018 16:46)  enalapril 2.5 mg oral tablet: 1 tab(s) orally once a day (14 Jul 2018 16:46)  Metoprolol Succinate ER 50 mg oral tablet, extended release: 1 tab(s) orally once a day (14 Jul 2018 16:46)      ROS:     REVIEW OF SYSTEMS:    CONSTITUTIONAL: No weakness, fatigue, malaise, fevers or chills, no weight change, appetite change  Throat: No Dysphagia, No Odynophagia  RESPIRATORY: No SOB  CARDIOVASCULAR: No chest pain   Muscloskeletal: no joints pain  NEUROLOGICAL: No syncope or diziness  SKIN: No pruritis  Heme/Lymph: no LN enlargement           VITALS:   T(F): 97.9 (07-14 @ 23:56), Max: 98.4 (07-14 @ 15:03)  HR: 92 (07-14 @ 23:56) (89 - 103)  BP: 112/71 (07-14 @ 23:56) (112/71 - 135/83)  BP(mean): 104 (07-14 @ 22:22) (104 - 104)  RR: 18 (07-14 @ 23:56) (18 - 20)  SpO2: 98% (07-14 @ 23:56) (98% - 100%)    I&O's Summary      PHYSICAL EXAM:  EYES: No scleral icterus   LUNG: Clear to auscultation bilaterally; No rales, rhonchi, wheezing, or rubs  HEART: RRR; No murmurs  ABDOMEN: Soft, +BS, mild epigastric Abdominal Tenderness, No guarding, No Jiménez Sign   Gen: pt looks comfortable.     LABS:                        8.9    8.56  )-----------( 293      ( 14 Jul 2018 16:01 )             30.1       LIVER FUNCTIONS - ( 14 Jul 2018 16:01 )  Alb: 3.7 g/dL / Pro: 6.7 g/dL / ALK PHOS: 111 U/L / ALT: 10 U/L / AST: 19 U/L / GGT: x           07-14    136  |  94<L>  |  9<L>  ----------------------------<  102<H>  4.5   |  24  |  0.6<L>    Ca    9.2      14 Jul 2018 16:01      CARDIAC MARKERS ( 14 Jul 2018 16:01 )  x     / <0.01 ng/mL / 46 U/L / x     / x            RADIOLOGY:    < from: US Abdomen Limited (07.15.18 @ 00:09) >  Minimal gallbladder wall thickening, with no additional sonographic   evidence of acute cholecystitis. No intra or extrahepatic biliary ductal   dilatation.    3.4 cm rounded hypodensity abutting the caudate lobelikely representing   pseudocyst as seen on concurrent CT.    < end of copied text >    < from: CT Abdomen and Pelvis w/ IV Cont (07.14.18 @ 19:50) >  PANCREAS: Significant peripancreatic fat stranding, with adjacent   multiple loculated collections, measuring 2.8 x 2.9 x 2.7 cm (series 5   image 143), 3.2 x 1.8 x 4 cm (image 186), 3.7 x 1.8 x 4 cm (image 182),   1.8 x 1 x 6 cm (series 5 image 236, series 6 image 22). The largest   collection is noted coursing inferiorly along the anterior aspect of the   IVC and measures about 12 x 2 x 7.5 cm. Another collection is noted in   the region of the caudate lobe of the liver. It measures up to 2.8 cm.    No discrete mass or areas of parenchymal necrosis is identified. Patent   splenic vein.    < end of copied text > GI HPI:  Patient is a 58y old  Male who presents with a chief complaint of abdominal pain (15 Jul 2018 01:27)    Hospital course:  57 yo M pmh of HLD, HTN, alcohol abuse in the past( states quit several years ago), recurrent alcoholic pancreatitis  recent MI presented with acute onset abdominal pain of one day duration. Pt reports having pain in lower abdomen and groin, 10/10, sharp , radiating to back . He states that he had an MI 1 month ago with stent placement, 1 week following MI he was nauseous , went to [facility] ,was found to have ? pancreatic mass and recommended to have MRI but signed out ama.   Denies fever/chills/nausea/vomiting/diarrhea/urinary symptoms. ROS otherwise negative for chest pain/sob/palpitations/headache/vision change/weakness/focal deficits. (15 Jul 2018 01:27)      PAST MEDICAL & SURGICAL HISTORY  High cholesterol  Hypertension  Lung mass  Pancreatic mass  Myocardial infarction acute      FAMILY HISTORY:  FAMILY HISTORY: NC      SOCIAL HISTORY:  smoker: +  Alcohol: +  Drug: -    ALLERGIES:  No Known Allergies      MEDICATIONS:  MEDICATIONS  (STANDING):  enalapril 2.5 milliGRAM(s) Oral daily  heparin  Injectable 5000 Unit(s) SubCutaneous every 8 hours  lactated ringers. 1000 milliLiter(s) (200 mL/Hr) IV Continuous <Continuous>  metoprolol succinate ER 50 milliGRAM(s) Oral daily  piperacillin/tazobactam IVPB. 3.375 Gram(s) IV Intermittent every 8 hours  ticagrelor 90 milliGRAM(s) Oral two times a day    MEDICATIONS  (PRN):  morphine  - Injectable 4 milliGRAM(s) IV Push every 4 hours PRN Moderate Pain (4 - 6)      HOME MEDICATIONS:  Home Medications:  atorvastatin 80 mg oral tablet: 1 tab(s) orally once a day (14 Jul 2018 16:46)  Brilinta (ticagrelor) 90 mg oral tablet: 1 tab(s) orally 2 times a day (14 Jul 2018 16:46)  enalapril 2.5 mg oral tablet: 1 tab(s) orally once a day (14 Jul 2018 16:46)  Metoprolol Succinate ER 50 mg oral tablet, extended release: 1 tab(s) orally once a day (14 Jul 2018 16:46)      ROS:     REVIEW OF SYSTEMS:    CONSTITUTIONAL: No weakness, fatigue, malaise, fevers or chills, no weight change, appetite change  Throat: No Dysphagia, No Odynophagia  RESPIRATORY: No SOB  CARDIOVASCULAR: No chest pain   Muscloskeletal: no joints pain  NEUROLOGICAL: No syncope or diziness  SKIN: No pruritis  Heme/Lymph: no LN enlargement           VITALS:   T(F): 97.9 (07-14 @ 23:56), Max: 98.4 (07-14 @ 15:03)  HR: 92 (07-14 @ 23:56) (89 - 103)  BP: 112/71 (07-14 @ 23:56) (112/71 - 135/83)  BP(mean): 104 (07-14 @ 22:22) (104 - 104)  RR: 18 (07-14 @ 23:56) (18 - 20)  SpO2: 98% (07-14 @ 23:56) (98% - 100%)    I&O's Summary      PHYSICAL EXAM:  EYES: No scleral icterus   LUNG: Clear to auscultation bilaterally; No rales, rhonchi, wheezing, or rubs  HEART: RRR; No murmurs  ABDOMEN: Soft, +BS, mild epigastric Abdominal Tenderness, No guarding, No Jiménez Sign   Gen: pt looks comfortable, NAD  Skin: intact, no rashes or edema  Neuro: intact, AAOx3    LABS:                        8.9    8.56  )-----------( 293      ( 14 Jul 2018 16:01 )             30.1       LIVER FUNCTIONS - ( 14 Jul 2018 16:01 )  Alb: 3.7 g/dL / Pro: 6.7 g/dL / ALK PHOS: 111 U/L / ALT: 10 U/L / AST: 19 U/L / GGT: x           07-14    136  |  94<L>  |  9<L>  ----------------------------<  102<H>  4.5   |  24  |  0.6<L>    Ca    9.2      14 Jul 2018 16:01      CARDIAC MARKERS ( 14 Jul 2018 16:01 )  x     / <0.01 ng/mL / 46 U/L / x     / x            RADIOLOGY:    < from: US Abdomen Limited (07.15.18 @ 00:09) >  Minimal gallbladder wall thickening, with no additional sonographic   evidence of acute cholecystitis. No intra or extrahepatic biliary ductal   dilatation.    3.4 cm rounded hypodensity abutting the caudate lobelikely representing   pseudocyst as seen on concurrent CT.    < end of copied text >    < from: CT Abdomen and Pelvis w/ IV Cont (07.14.18 @ 19:50) >  PANCREAS: Significant peripancreatic fat stranding, with adjacent   multiple loculated collections, measuring 2.8 x 2.9 x 2.7 cm (series 5   image 143), 3.2 x 1.8 x 4 cm (image 186), 3.7 x 1.8 x 4 cm (image 182),   1.8 x 1 x 6 cm (series 5 image 236, series 6 image 22). The largest   collection is noted coursing inferiorly along the anterior aspect of the   IVC and measures about 12 x 2 x 7.5 cm. Another collection is noted in   the region of the caudate lobe of the liver. It measures up to 2.8 cm.    No discrete mass or areas of parenchymal necrosis is identified. Patent   splenic vein.    < end of copied text >

## 2018-07-15 NOTE — H&P ADULT - ATTENDING COMMENTS
Riley Carrera 59 yo M pmh of HLD, HTN, alcohol abuse in the past( states quit several years ago)  recent MI presented with acute onset abdominal pain of one day duration. Pt reports having pain in lower abdomen and groin, 10/10, sharp , radiating to back . He states that he had an MI 1 month ago with stent placement, 1 week following MI he was nauseous , went to [facility] ,was found to have ? pancreatic mass and recommended to have MRI but signed out ama.   Denies fever/chills/nausea/vomiting/diarrhea/urinary symptoms. ROS otherwise negative for chest pain/sob/palpitations/headache/vision change/weakness/focal deficits.    REVIEW OF SYSTEMS: see cc/HPI  CONSTITUTIONAL: No weakness, fevers or chills  EYES/ENT: No visual changes;  No vertigo or throat pain   NECK: No pain or stiffness  RESPIRATORY: No cough, wheezing, hemoptysis; No shortness of breath  CARDIOVASCULAR: No chest pain or palpitations  GASTROINTESTINAL: No abdominal or epigastric pain. No nausea, vomiting, or hematemesis; No diarrhea or constipation. No melena or hematochezia.  GENITOURINARY: No dysuria, frequency or hematuria  NEUROLOGICAL: No numbness or weakness  SKIN: No itching, rashes    Physical Exam:    General: WN/WD NAD  Neurology: A&Ox3, nonfocal, follows commands  Eyes: PERRLA/ EOMI  ENT/Neck: Neck supple, trachea midline, No JVD  Respiratory: CTA B/L, No wheezing, rales, rhonchi  CV: Normal rate regular rhythm, S1S2, no murmurs, rubs or gallops  Abdominal: Soft, ND +BS, diffuse pain ( decreased w/ Morphine)  Extremities: No edema, + peripheral pulses  Skin: No Rashes, Hematoma, Ecchymosis  Incisions: n/a  Tubes: n/a    A/P  Acute pancreatitis  -NPO , IV fluids - large volume, Is and Os  -PRN pain alcohol avoidance   -GI prophylaxis    Pancreatic cysts   -serial imaging  -GI and surgical eval    CAD/MI/HTN  - stable and asymptomatic  -c/w outpatient Rx    Dyslipidemia  -outpatient Rx    DVT prophylaxis Riley Carrera 57 yo M pmh of HLD, HTN, alcohol abuse in the past( states quit several years ago)  recent MI presented with acute onset abdominal pain of one day duration. Pt reports having pain in lower abdomen and groin, 10/10, sharp , radiating to back . He states that he had an MI 1 month ago with stent placement, 1 week following MI he was nauseous , went to [facility] ,was found to have ? pancreatic mass and recommended to have MRI but signed out ama.   Denies fever/chills/nausea/vomiting/diarrhea/urinary symptoms. ROS otherwise negative for chest pain/sob/palpitations/headache/vision change/weakness/focal deficits.    REVIEW OF SYSTEMS: see cc/HPI  CONSTITUTIONAL: No weakness, fevers or chills  EYES/ENT: No visual changes;  No vertigo or throat pain   NECK: No pain or stiffness  RESPIRATORY: No cough, wheezing, hemoptysis; No shortness of breath  CARDIOVASCULAR: No chest pain or palpitations  GASTROINTESTINAL: No abdominal or epigastric pain. No nausea, vomiting, or hematemesis; No diarrhea or constipation. No melena or hematochezia.  GENITOURINARY: No dysuria, frequency or hematuria  NEUROLOGICAL: No numbness or weakness  SKIN: No itching, rashes    Physical Exam:    General: WN/WD NAD  Neurology: A&Ox3, nonfocal, follows commands  Eyes: PERRLA/ EOMI  ENT/Neck: Neck supple, trachea midline, No JVD  Respiratory: CTA B/L, No wheezing, rales, rhonchi  CV: Normal rate regular rhythm, S1S2, no murmurs, rubs or gallops  Abdominal: Soft, ND +BS, diffuse pain ( decreased w/ Morphine)  Extremities: No edema, + peripheral pulses  Skin: No Rashes, Hematoma, Ecchymosis  Incisions: n/a  Tubes: n/a    A/P  Acute pancreatitis  -NPO , IV fluids - large volume, Is and Os  -PRN pain alcohol avoidance   -GI prophylaxis    Pancreatic cysts   -serial imaging  -GI and surgical eval    CAD/MI/HTN  - stable and asymptomatic  -c/w outpatient Rx    Dyslipidemia  -outpatient Rx    DVT prophylaxis    : I fully agree withe history/exam/assessment after my independent history/exam/assessment-trend lipase and symptoms

## 2018-07-15 NOTE — H&P ADULT - ASSESSMENT
57 yo M pmh of HLD, HTN, alcohol abuse in the past( states quit several years ago),chronic pancreatitis,  recent MI presented with acute onset abdominal pain of one day duration.    #Pancreatitis with multiple small pseudocysts  keep NPO  IV fluid: LR@200 cc/hr  iv morphine for pain control  seen by surgery : no acute intervention  GI consult for EUS aspiration. f/u GI recommendations, may consider mrcp after GI eval    #Recent MI with DREA  c/w brilanta/bb/ace/statin    #HTN/DLD:c/w current home meds  #hx of alcohol abuse . pt states he has been sober since few years  check blood alcohol level for now    #diet:npo; iv hydration  dvt ppx:sequentials 57 yo M pmh of HLD, HTN, alcohol abuse in the past( states quit several years ago),chronic pancreatitis,  recent MI presented with acute onset abdominal pain of one day duration.    #Acute vs chronic Pancreatitis with multiple small pseudocysts  ct findings noted:Extensive peripancreatic fat stranding and surrounding fluid, compatible with pancreatitis and multiple loculated collections likely reflecting pseudocysts. Superimposed infection is difficult to exclude.  does not meet sepsis criteria, however superimposed infection is difficult to rule out on imaging , started on zosyn by ER. will emperically continue with iv abx for now.  keep NPO  IV fluid: LR@200 cc/hr  iv morphine for pain control  seen by surgery : no acute intervention  GI consult for EUS aspiration. f/u GI recommendations, may consider mrcp after GI eval    #Recent MI with DREA  c/w brilanta/bb/ace/statin    #HTN/DLD:c/w current home meds  #hx of alcohol abuse . pt states he has been sober since few years  check blood alcohol level for now    #diet:npo; iv hydration  dvt ppx:sequentials 59 yo M pmh of HLD, HTN, alcohol abuse in the past( states quit several years ago),chronic pancreatitis,  recent MI presented with acute onset abdominal pain of one day duration.    #Acute vs chronic Pancreatitis with multiple small pseudocysts  ct findings noted:Extensive peripancreatic fat stranding and surrounding fluid, compatible with pancreatitis and multiple loculated collections likely reflecting pseudocysts. Superimposed infection is difficult to exclude.  does not meet sepsis criteria, however superimposed infection is difficult to rule out on imaging , started on zosyn by ER. will emperically continue with iv abx for now.  keep NPO  IV fluid: LR@200 cc/hr  iv morphine for pain control  seen by surgery : no acute intervention  GI consult for EUS aspiration. f/u GI recommendations, may consider mrcp after GI eval    #Microcytic Anemia  Check Iron studies    #Recent MI with DREA  c/w brilanta/bb/ace/statin    #HTN/DLD:c/w current home meds  #hx of alcohol abuse . pt states he has been sober since few years  check blood alcohol level for now    #diet:npo; iv hydration  dvt ppx: heparin s/q 57 yo M pmh of HLD, HTN, alcohol abuse in the past( states quit several years ago),chronic pancreatitis,  recent MI presented with acute onset abdominal pain of one day duration.    #Acute vs chronic Pancreatitis with multiple small pseudocysts  ct findings noted:Extensive peripancreatic fat stranding and surrounding fluid, compatible with pancreatitis and multiple loculated collections likely reflecting pseudocysts. Superimposed infection is difficult to exclude.  does not meet sepsis criteria, however superimposed infection is difficult to rule out on imaging , started on zosyn by ER. will emperically continue with iv abx for now.  keep NPO  IV fluid: LR@200 cc/hr  iv morphine for pain control  seen by surgery : no acute intervention  GI consult for EUS aspiration. , may consider mrcp after GI eval  r/o pancreatitis cause : ? alcohol abuse vs recent drug use vs high TG---- will hold high dose statin for now, check TG level. f/u GI recommendations    #Microcytic Anemia  no s/s of active bleeding.Guaiac negative  Check Iron studies    #Recent MI with DREA  c/w brilanta/bb/ace/statin    #HTN/DLD:c/w current home meds  #hx of alcohol abuse . pt states he has been sober since few years  check blood alcohol level for now    #diet:npo; iv hydration  dvt ppx: heparin s/q 59 yo M pmh of HLD, HTN, alcohol abuse in the past( states quit several years ago),chronic pancreatitis,  recent MI presented with acute onset abdominal pain of one day duration.    #Acute vs chronic Pancreatitis with multiple small pseudocysts  ct findings noted:Extensive peripancreatic fat stranding and surrounding fluid, compatible with pancreatitis and multiple loculated collections likely reflecting pseudocysts. Superimposed infection is difficult to exclude.  does not meet sepsis criteria, however superimposed infection is difficult to rule out on imaging , started on zosyn by ER. will emperically continue with iv abx for now.  keep NPO  IV fluid: LR@200 cc/hr  iv morphine for pain control  seen by surgery : no acute intervention  GI consult for EUS aspiration. , may consider mrcp after GI eval  r/o pancreatitis cause : ? alcohol abuse vs recent drug use vs high TG---- will hold high dose statin for now, check TG level. f/u GI recommendations    #Microcytic Anemia  no s/s of active bleeding.Guaiac negative  Check Iron studies  type and screen  monitor h&h.       #Recent MI with DREA  c/w brilanta/bb/ace/statin    #HTN/DLD:c/w current home meds  #hx of alcohol abuse . pt states he has been sober since few years  check blood alcohol level for now    #diet:npo; iv hydration  dvt ppx: heparin s/q

## 2018-07-15 NOTE — CONSULT NOTE ADULT - ASSESSMENT
57 yo M pmh of HLD, HTN, alcohol abuse in the past( states quit several years ago), h/o recurrent alcoholic  pancreatitis,  recent MI s/p stent placement presented with acute onset abdominal pain of one day duration, found to have elevated lipase and CT abdomen showed pancreatic inflammation.  Pt denies recent alcohol intake.    1) Acute on chronic pancreatitis  Keep NPO for now  Pain control  IV LR @ 250 cc/hr  monitor urine output, BUN/Cr, pain scale    2) ON CT Abdomen - Multiple loculated collections adjacent to pancreas.  Likely pseudocyst from prior pancreatitis episodes. (r/o walled off pancreatic necrosis)  No evidence of fever, leukocytosis, sepsis.  will consider doing MRI pancreatic protocol   No need for IV ABX   currently pt is on dual antiplatelets    will discuss with attending. 57 yo M pmh of HLD, HTN, alcohol abuse in the past( states quit several years ago), h/o recurrent alcoholic  pancreatitis,  recent MI s/p stent placement presented with acute onset abdominal pain of one day duration, found to have elevated lipase and CT abdomen showed pancreatic inflammation.  Pt denies recent alcohol intake.    1) Acute on chronic pancreatitis  Keep NPO for now  Pain control  IV LR @ 250 cc/hr  monitor urine output, BUN/Cr, pain scale    2) ON CT Abdomen - Multiple loculated collections adjacent to pancreas.  Likely pseudocyst from prior pancreatitis episodes. (r/o walled off pancreatic necrosis)  No evidence of fever, leukocytosis, sepsis.  No need for IV ABX   currently pt is on dual antiplatelets, will treat conservatively at this point.  If pain persists, then will obtain MRI or possible EUS   will f/up    will discuss with attending.

## 2018-07-15 NOTE — H&P ADULT - HISTORY OF PRESENT ILLNESS
57 yo M pmh of HLD, HTN, alcohol abuse in the past( states quit several years ago)  recent MI presented with acute onset abdominal pain of one day duration. Pt reports having pain in lower abdomen and groin, 10/10, sharp , radiating to back . He states that he had an MI 1 month ago with stent placement, 1 week following MI he was nauseous , went to [facility] ,was found to have ? pancreatic mass and recommended to have MRI but signed out ama.   Denies fever/chills/nausea/vomiting/diarrhea/urinary symptoms. ROS otherwise negative for chest pain/sob/palpitations/headache/vision change/weakness/focal deficits. 57 yo M pmh of HLD, HTN, alcohol abuse in the past( states quit several years ago)  recent MI presented with acute onset abdominal pain of one day duration at home. Pt reports having pain in lower abdomen and groin, 10/10, sharp , radiating to back . He states that he had an MI 1 month ago with stent placement, 1 week following MI he was nauseous , went to [facility] ,was found to have ? pancreatic mass and recommended to have MRI but signed out ama.   Denies fever/chills/nausea/vomiting/diarrhea/urinary symptoms. ROS otherwise negative for chest pain/sob/palpitations/headache/vision change/weakness/focal deficits.

## 2018-07-16 DIAGNOSIS — K85.90 ACUTE PANCREATITIS WITHOUT NECROSIS OR INFECTION, UNSPECIFIED: ICD-10-CM

## 2018-07-16 DIAGNOSIS — Z29.9 ENCOUNTER FOR PROPHYLACTIC MEASURES, UNSPECIFIED: ICD-10-CM

## 2018-07-16 DIAGNOSIS — F10.10 ALCOHOL ABUSE, UNCOMPLICATED: ICD-10-CM

## 2018-07-16 DIAGNOSIS — I10 ESSENTIAL (PRIMARY) HYPERTENSION: ICD-10-CM

## 2018-07-16 DIAGNOSIS — I25.10 ATHEROSCLEROTIC HEART DISEASE OF NATIVE CORONARY ARTERY WITHOUT ANGINA PECTORIS: ICD-10-CM

## 2018-07-16 LAB
ALBUMIN SERPL ELPH-MCNC: 2.9 G/DL — LOW (ref 3.5–5.2)
ALP SERPL-CCNC: 86 U/L — SIGNIFICANT CHANGE UP (ref 30–115)
ALT FLD-CCNC: 7 U/L — SIGNIFICANT CHANGE UP (ref 0–41)
AMYLASE P1 CFR SERPL: 457 U/L — CRITICAL HIGH (ref 25–115)
ANION GAP SERPL CALC-SCNC: 21 MMOL/L — HIGH (ref 7–14)
AST SERPL-CCNC: 15 U/L — SIGNIFICANT CHANGE UP (ref 0–41)
BILIRUB DIRECT SERPL-MCNC: 0.2 MG/DL — SIGNIFICANT CHANGE UP (ref 0–0.2)
BILIRUB INDIRECT FLD-MCNC: 0.3 MG/DL — SIGNIFICANT CHANGE UP (ref 0.2–1.2)
BILIRUB SERPL-MCNC: 0.5 MG/DL — SIGNIFICANT CHANGE UP (ref 0.2–1.2)
BUN SERPL-MCNC: 6 MG/DL — LOW (ref 10–20)
CALCIUM SERPL-MCNC: 8.4 MG/DL — LOW (ref 8.5–10.1)
CHLORIDE SERPL-SCNC: 95 MMOL/L — LOW (ref 98–110)
CO2 SERPL-SCNC: 21 MMOL/L — SIGNIFICANT CHANGE UP (ref 17–32)
CREAT SERPL-MCNC: 0.5 MG/DL — LOW (ref 0.7–1.5)
CULTURE RESULTS: SIGNIFICANT CHANGE UP
FERRITIN SERPL-MCNC: 136 NG/ML — SIGNIFICANT CHANGE UP (ref 30–400)
GLUCOSE SERPL-MCNC: 61 MG/DL — LOW (ref 70–99)
HCT VFR BLD CALC: 27.6 % — LOW (ref 42–52)
HGB BLD-MCNC: 8 G/DL — LOW (ref 14–18)
LIDOCAIN IGE QN: 265 U/L — HIGH (ref 7–60)
MCHC RBC-ENTMCNC: 21.4 PG — LOW (ref 27–31)
MCHC RBC-ENTMCNC: 29 G/DL — LOW (ref 32–37)
MCV RBC AUTO: 73.8 FL — LOW (ref 80–94)
NRBC # BLD: 0 /100 WBCS — SIGNIFICANT CHANGE UP (ref 0–0)
PLATELET # BLD AUTO: 314 K/UL — SIGNIFICANT CHANGE UP (ref 130–400)
POTASSIUM SERPL-MCNC: 4.3 MMOL/L — SIGNIFICANT CHANGE UP (ref 3.5–5)
POTASSIUM SERPL-SCNC: 4.3 MMOL/L — SIGNIFICANT CHANGE UP (ref 3.5–5)
PROT SERPL-MCNC: 4.9 G/DL — LOW (ref 6–8)
RBC # BLD: 3.74 M/UL — LOW (ref 4.7–6.1)
RBC # FLD: 21.9 % — HIGH (ref 11.5–14.5)
SODIUM SERPL-SCNC: 137 MMOL/L — SIGNIFICANT CHANGE UP (ref 135–146)
SPECIMEN SOURCE: SIGNIFICANT CHANGE UP
WBC # BLD: 7.37 K/UL — SIGNIFICANT CHANGE UP (ref 4.8–10.8)
WBC # FLD AUTO: 7.37 K/UL — SIGNIFICANT CHANGE UP (ref 4.8–10.8)

## 2018-07-16 RX ORDER — LANOLIN ALCOHOL/MO/W.PET/CERES
5 CREAM (GRAM) TOPICAL AT BEDTIME
Qty: 0 | Refills: 0 | Status: DISCONTINUED | OUTPATIENT
Start: 2018-07-16 | End: 2018-07-17

## 2018-07-16 RX ORDER — ASPIRIN/CALCIUM CARB/MAGNESIUM 324 MG
81 TABLET ORAL DAILY
Qty: 0 | Refills: 0 | Status: DISCONTINUED | OUTPATIENT
Start: 2018-07-16 | End: 2018-07-17

## 2018-07-16 RX ADMIN — SODIUM CHLORIDE 200 MILLILITER(S): 9 INJECTION, SOLUTION INTRAVENOUS at 22:09

## 2018-07-16 RX ADMIN — TICAGRELOR 90 MILLIGRAM(S): 90 TABLET ORAL at 05:56

## 2018-07-16 RX ADMIN — Medication 81 MILLIGRAM(S): at 12:39

## 2018-07-16 RX ADMIN — Medication 50 MILLIGRAM(S): at 05:57

## 2018-07-16 RX ADMIN — Medication 2.5 MILLIGRAM(S): at 17:57

## 2018-07-16 RX ADMIN — Medication 2.5 MILLIGRAM(S): at 05:57

## 2018-07-16 RX ADMIN — SODIUM CHLORIDE 200 MILLILITER(S): 9 INJECTION, SOLUTION INTRAVENOUS at 06:00

## 2018-07-16 RX ADMIN — PIPERACILLIN AND TAZOBACTAM 25 GRAM(S): 4; .5 INJECTION, POWDER, LYOPHILIZED, FOR SOLUTION INTRAVENOUS at 05:56

## 2018-07-16 RX ADMIN — TICAGRELOR 90 MILLIGRAM(S): 90 TABLET ORAL at 17:57

## 2018-07-16 RX ADMIN — Medication 5 MILLIGRAM(S): at 22:09

## 2018-07-16 NOTE — PROGRESS NOTE ADULT - ASSESSMENT
58M PMHx HTN, HLD, CAD/MI s/p DREA (1 mos ago), alcohol abuse, here with pancreatitis with pseudocysts.

## 2018-07-16 NOTE — DIETITIAN INITIAL EVALUATION ADULT. - DIET TYPE
with ensure clear q12hr (this is the diet order just ordered after RD spoken with LIP). Previously was nPO./clear fluid

## 2018-07-16 NOTE — DIETITIAN INITIAL EVALUATION ADULT. - NS FNS WEIGHT CHANGE REASON
unintentional/ever since s/p heart attack 1 month ago per pt report. Was 178# before that, now 121#. 32% of weight loss in 1 month is doubtful. Will monitor.

## 2018-07-16 NOTE — DIETITIAN INITIAL EVALUATION ADULT. - PT NOT SOURCE
other (specify)/BMI<18. Pt is very alert and oriented and is angry not getting a clear tray as promised, and cursing. No edema. Skin intact/tattoos. LBM none at this time per pt. No oral issue.

## 2018-07-16 NOTE — DIETITIAN INITIAL EVALUATION ADULT. - ENERGY NEEDS
5654-5589 kcal/day (MSJ x 1.2-1.3 + 250kcal for weight gain 1/2 lbs per week)  73-87 g/day (1.0-1.2 g/kg of IBW)  1ml/kcal

## 2018-07-16 NOTE — PROGRESS NOTE ADULT - PROBLEM SELECTOR PLAN 1
improving clinically  LR at 200 cc/hr  check TTE (h/o MI recent), monitor closely for fluid overload  start reg diet today  appreciate GI recs

## 2018-07-16 NOTE — DIETITIAN INITIAL EVALUATION ADULT. - SOURCE
p/w abd pain and found pancreatitis on CT. Plan for CT w/ venous contrast. currently pt ding well, no pain, no n/v, tolerating CLD and +BM./other (specify)/patient

## 2018-07-16 NOTE — PROGRESS NOTE ADULT - ASSESSMENT
58M PMHx HTN, HLD, CAD/MI s/p DREA (1 mos ago), alcohol abuse, here with pancreatitis with pseudocysts. r/o Pancreatic Mass r/o Lung mass.    1. Acute on Chronic Pancreatitis  Lipase trending down >600 on admission now 265  amylase 457  c/w LR@200  -tolerating regular diet  -Anticipate medical discharge tommorow following reimaging  -Possible MRCP, CT Abdomen w/ contrast to recharacterize pancreatic lesion.  -f/u surgery recs  -Pain control Morphine PRN    2. Microcytic Anemia  Monitor H/H at baseline   HB 8.0    3. Recent MI 1 month ago w/ Stent  c/w Brilanta, BB, ACE, Statin Asprin    4. HTN  c/w home meds    5. HLD  Home Meds    6. Diet Dash  ppx Heparin SQ

## 2018-07-17 VITALS
RESPIRATION RATE: 18 BRPM | TEMPERATURE: 98 F | HEART RATE: 76 BPM | DIASTOLIC BLOOD PRESSURE: 61 MMHG | SYSTOLIC BLOOD PRESSURE: 125 MMHG

## 2018-07-17 LAB
ALBUMIN SERPL ELPH-MCNC: 3 G/DL — LOW (ref 3.5–5.2)
ALP SERPL-CCNC: 108 U/L — SIGNIFICANT CHANGE UP (ref 30–115)
ALT FLD-CCNC: 12 U/L — SIGNIFICANT CHANGE UP (ref 0–41)
ANION GAP SERPL CALC-SCNC: 13 MMOL/L — SIGNIFICANT CHANGE UP (ref 7–14)
AST SERPL-CCNC: 25 U/L — SIGNIFICANT CHANGE UP (ref 0–41)
BILIRUB DIRECT SERPL-MCNC: <0.2 MG/DL — SIGNIFICANT CHANGE UP (ref 0–0.2)
BILIRUB INDIRECT FLD-MCNC: >0.1 MG/DL — LOW (ref 0.2–1.2)
BILIRUB SERPL-MCNC: 0.3 MG/DL — SIGNIFICANT CHANGE UP (ref 0.2–1.2)
BUN SERPL-MCNC: <3 MG/DL — LOW (ref 10–20)
CALCIUM SERPL-MCNC: 8.4 MG/DL — LOW (ref 8.5–10.1)
CHLORIDE SERPL-SCNC: 100 MMOL/L — SIGNIFICANT CHANGE UP (ref 98–110)
CO2 SERPL-SCNC: 25 MMOL/L — SIGNIFICANT CHANGE UP (ref 17–32)
CREAT SERPL-MCNC: <0.5 MG/DL — LOW (ref 0.7–1.5)
GLUCOSE SERPL-MCNC: 121 MG/DL — HIGH (ref 70–99)
HCT VFR BLD CALC: 27.4 % — LOW (ref 42–52)
HGB BLD-MCNC: 8 G/DL — LOW (ref 14–18)
LIDOCAIN IGE QN: 172 U/L — HIGH (ref 7–60)
MCHC RBC-ENTMCNC: 21.4 PG — LOW (ref 27–31)
MCHC RBC-ENTMCNC: 29.2 G/DL — LOW (ref 32–37)
MCV RBC AUTO: 73.3 FL — LOW (ref 80–94)
NRBC # BLD: 0 /100 WBCS — SIGNIFICANT CHANGE UP (ref 0–0)
PLATELET # BLD AUTO: 366 K/UL — SIGNIFICANT CHANGE UP (ref 130–400)
POTASSIUM SERPL-MCNC: 4.7 MMOL/L — SIGNIFICANT CHANGE UP (ref 3.5–5)
POTASSIUM SERPL-SCNC: 4.7 MMOL/L — SIGNIFICANT CHANGE UP (ref 3.5–5)
PROT SERPL-MCNC: 5.4 G/DL — LOW (ref 6–8)
RBC # BLD: 3.74 M/UL — LOW (ref 4.7–6.1)
RBC # FLD: 21.6 % — HIGH (ref 11.5–14.5)
SODIUM SERPL-SCNC: 138 MMOL/L — SIGNIFICANT CHANGE UP (ref 135–146)
WBC # BLD: 6.45 K/UL — SIGNIFICANT CHANGE UP (ref 4.8–10.8)
WBC # FLD AUTO: 6.45 K/UL — SIGNIFICANT CHANGE UP (ref 4.8–10.8)

## 2018-07-17 RX ORDER — ASPIRIN/CALCIUM CARB/MAGNESIUM 324 MG
1 TABLET ORAL
Qty: 0 | Refills: 0 | COMMUNITY
Start: 2018-07-17

## 2018-07-17 RX ORDER — SODIUM CHLORIDE 9 MG/ML
1000 INJECTION, SOLUTION INTRAVENOUS
Qty: 0 | Refills: 0 | Status: DISCONTINUED | OUTPATIENT
Start: 2018-07-17 | End: 2018-07-17

## 2018-07-17 RX ADMIN — TICAGRELOR 90 MILLIGRAM(S): 90 TABLET ORAL at 05:38

## 2018-07-17 RX ADMIN — Medication 2.5 MILLIGRAM(S): at 05:38

## 2018-07-17 RX ADMIN — Medication 81 MILLIGRAM(S): at 12:12

## 2018-07-17 RX ADMIN — Medication 50 MILLIGRAM(S): at 05:38

## 2018-07-17 RX ADMIN — SODIUM CHLORIDE 200 MILLILITER(S): 9 INJECTION, SOLUTION INTRAVENOUS at 03:15

## 2018-07-17 NOTE — DISCHARGE NOTE ADULT - CARE PROVIDERS DIRECT ADDRESSES
,DirectAddress_Unknown,[email or web address] ,DirectAddress_Unknown,[email or web address]

## 2018-07-17 NOTE — PROGRESS NOTE ADULT - SUBJECTIVE AND OBJECTIVE BOX
HEMAL NICOLA  58y  Male      Patient is a 58y old  Male who presents with a chief complaint of lower abdominal pain (15 Jul 2018 01:27)      INTERVAL HPI/OVERNIGHT EVENTS:  Hungry, no abd pain. No cp, sob, fever, dysuria.    Vital Signs Last 24 Hrs  T(C): 37.3 (16 Jul 2018 07:34), Max: 37.3 (16 Jul 2018 07:34)  T(F): 99.2 (16 Jul 2018 07:34), Max: 99.2 (16 Jul 2018 07:34)  HR: 99 (16 Jul 2018 07:34) (74 - 99)  BP: 106/67 (16 Jul 2018 07:34) (106/67 - 131/63)  BP(mean): --  RR: 18 (16 Jul 2018 07:34) (18 - 18)  SpO2: --    PHYSICAL EXAM:  GENERAL: NAD, well-groomed, well-developed  HEAD:  Atraumatic, Normocephalic    CHEST/LUNG: Clear to auscultation bilaterally; No rales, rhonchi, wheezing, or rubs  HEART: Regular rate and rhythm; No murmurs, rubs, or gallops  ABDOMEN: Soft, Nontender, Nondistended;  EXTREMITIES:  2+ Peripheral Pulses, No clubbing, cyanosis, or edema    SKIN: No rashes or lesions    Consultant(s) Notes Reviewed:  [x ] YES  [ ] NO  Care Discussed with Consultants/Other Providers [ x] YES  [ ] NO    LABS:                        8.0    7.37  )-----------( 314      ( 16 Jul 2018 06:43 )             27.6     07-16    137  |  95<L>  |  6<L>  ----------------------------<  61<L>  4.3   |  21  |  0.5<L>    Ca    8.4<L>      16 Jul 2018 06:43    TPro  4.9<L>  /  Alb  2.9<L>  /  TBili  0.5  /  DBili  0.2  /  AST  15  /  ALT  7   /  AlkPhos  86  07-16      Urinalysis Basic - ( 15 Jul 2018 00:50 )    Color: Yellow / Appearance: Clear / SG: >=1.030 / pH: x  Gluc: x / Ketone: 40  / Bili: Negative / Urobili: 1.0   Blood: x / Protein: 30 / Nitrite: Negative   Leuk Esterase: Negative / RBC: x / WBC x   Sq Epi: x / Non Sq Epi: Few /HPF / Bacteria: occ /HPF        CAPILLARY BLOOD GLUCOSE          RADIOLOGY & ADDITIONAL TESTS:    Imaging Personally Reviewed:  [ ] YES  [ ] NO
GENERAL SURGERY PROGRESS NOTE     NICOLA RECIO  58y  Male  Hospital day :2d    OVERNIGHT EVENTS:    Patient doing well. Denies abdominal pain, fever/chills, n/v. Tolerating CLD. +BM.  T(F): 99.2 (07-16-18 @ 07:34), Max: 99.2 (07-16-18 @ 07:34)  HR: 99 (07-16-18 @ 07:34) (74 - 99)  BP: 106/67 (07-16-18 @ 07:34) (106/67 - 131/63)  ABP: --  ABP(mean): --  RR: 18 (07-16-18 @ 07:34) (18 - 18)  SpO2: --    DIET/FLUIDS: lactated ringers. 1000 milliLiter(s) IV Continuous <Continuous>    NG:                                                                                DRAINS:     BM:     EMESIS:     URINE:      GI proph:    AC/ proph: heparin  Injectable 5000 Unit(s) SubCutaneous every 8 hours    ABx: piperacillin/tazobactam IVPB. 3.375 Gram(s) IV Intermittent every 8 hours      PHYSICAL EXAM:  GENERAL: NAD, well-appearing  ABDOMEN: Soft, Nontender, Nondistended;       LABS  Labs:  CAPILLARY BLOOD GLUCOSE                              8.3    7.72  )-----------( 282      ( 15 Jul 2018 10:00 )             28.1       Auto Neutrophil %: 74.5 % (07-15-18 @ 10:00)  Auto Immature Granulocyte %: 0.4 % (07-15-18 @ 10:00)    07-15    134<L>  |  94<L>  |  9<L>  ----------------------------<  68<L>  3.8   |  23  |  <0.5<L>      Calcium, Total Serum: 8.3 mg/dL (07-15-18 @ 10:00)  Calcium, Total Serum: 8.5 mg/dL (07-15-18 @ 10:00)      LFTs:             4.9  | 0.5  | 15       ------------------[86      ( 16 Jul 2018 02:23 )  2.9  | 0.2  | 7           Lipase:265    Amylase:457       Lactate, Blood: 0.8 mmol/L (07-14-18 @ 16:01)      Coags:    CARDIAC MARKERS ( 14 Jul 2018 16:01 )  x     / <0.01 ng/mL / 46 U/L / x     / x          Urinalysis Basic - ( 15 Jul 2018 00:50 )    Color: Yellow / Appearance: Clear / SG: >=1.030 / pH: x  Gluc: x / Ketone: 40  / Bili: Negative / Urobili: 1.0   Blood: x / Protein: 30 / Nitrite: Negative   Leuk Esterase: Negative / RBC: x / WBC x   Sq Epi: x / Non Sq Epi: Few /HPF / Bacteria: occ /HPF            RADIOLOGY & ADDITIONAL TESTS:      A/P  NICOLA RECIO  58y  Male  Hospital day :2d  Patient presented due to abdominal pain and found to have pancreatitis on CT.    PLAN:    - Pancreatic CT with venous contrast    - will follow PRN
GENERAL SURGERY PROGRESS NOTE    Patient: NICOLA RECIO , 58y ([birth year])Male   MRN: [id number]  Location: [address]  Visit: 07-14-18 Inpatient  Date: 07-17-18 @ 01:26    Hospital Day #: 4    Procedure/Dx/Injuries: Pancreatitis, multiple fluid collections    Events of past 24 hours: No acute events overnight. Pt denies pain or other complaints. Resting comfortably. Pt is aware of plan for possible d/c tomorrow.    PAST MEDICAL & SURGICAL HISTORY:  High cholesterol  Hypertension  Lung mass  Pancreatic mass  Myocardial infarction acute  No significant past surgical history      Vitals: T(F): 100 (07-16-18 @ 23:35), Max: 100 (07-16-18 @ 23:35)  HR: 81 (07-16-18 @ 23:35)  BP: 140/74 (07-16-18 @ 23:35)  RR: 18 (07-16-18 @ 23:35)  SpO2: --      Pain (0-10):            Pain Control Adequate: [] YES [] N    Diet, DASH/TLC:   Sodium & Cholesterol Restricted      Fluids:     I & O's:    07-15-18 @ 07:01  -  07-16-18 @ 07:00  --------------------------------------------------------  IN:    IV PiggyBack: 200 mL  Total IN: 200 mL    OUT:  Total OUT: 0 mL    Total NET: 200 mL        Bowel Movement: : [] YES [] NO  Flatus: : [] YES [] NO    PHYSICAL EXAM  GEN:  CV:  LUNGS:  ABD:  EXT:  WOUND:  INCISION:    MEDICATIONS  (STANDING):  aspirin  chewable 81 milliGRAM(s) Oral daily  enalapril 2.5 milliGRAM(s) Oral two times a day  heparin  Injectable 5000 Unit(s) SubCutaneous every 8 hours  lactated ringers. 1000 milliLiter(s) (200 mL/Hr) IV Continuous <Continuous>  melatonin 5 milliGRAM(s) Oral at bedtime  metoprolol succinate ER 50 milliGRAM(s) Oral daily  ticagrelor 90 milliGRAM(s) Oral two times a day    MEDICATIONS  (PRN):  morphine  - Injectable 4 milliGRAM(s) IV Push every 4 hours PRN Moderate Pain (4 - 6)      DVT PROPHYLAXIS: [] YES [] NO   GI PROPHYLAXIS: [] YES [] NO   ANTICOAGULATION: [] YES [] NO   ANTIBIOTICS: [] YES [] NO       LAB/STUDIES:  CAPILLARY BLOOD GLUCOSE                              8.0    7.37  )-----------( 314      ( 16 Jul 2018 06:43 )             27.6     07-16    137  |  95<L>  |  6<L>  ----------------------------<  61<L>  4.3   |  21  |  0.5<L>    Ca    8.4<L>      16 Jul 2018 06:43    TPro  4.9<L>  /  Alb  2.9<L>  /  TBili  0.5  /  DBili  0.2  /  AST  15  /  ALT  7   /  AlkPhos  86  07-16               4.9  | 0.5  | 15       ------------------[86      ( 16 Jul 2018 02:23 )  2.9  | 0.2  | 7           Lipase:265    Amylase:457                  Culture - Urine (collected 15 Jul 2018 00:50)  Source: .Urine Clean Catch (Midstream)  Final Report (16 Jul 2018 11:49):    <10,000 CFU/ml    Normal Urogenital ruth present      IMAGING:
GI HPI Today:  Patient is a 58y old  Male who presents with a chief complaint of abdominal pain (15 Jul 2018 01:27)  Pt seen and examined today.  Pt denies abd pain, nausea, vomiting, fever. Pt passing BM, able to tolerate regular diet.         Hospital course:  57 yo M pmh of HLD, HTN, alcohol abuse in the past( states quit several years ago), recurrent alcoholic pancreatitis  recent MI presented with acute onset abdominal pain of one day duration. Pt reports having pain in lower abdomen and groin, 10/10, sharp , radiating to back . He states that he had an MI 1 month ago with stent placement, 1 week following MI he was nauseous , went to [facility] ,was found to have ? pancreatic mass and recommended to have MRI but signed out ama.   Denies fever/chills/nausea/vomiting/diarrhea/urinary symptoms. ROS otherwise negative for chest pain/sob/palpitations/headache/vision change/weakness/focal deficits. (15 Jul 2018 01:27)        PAST MEDICAL & SURGICAL HISTORY  High cholesterol  Hypertension  Lung mass  Pancreatic mass  Myocardial infarction acute  No significant past surgical history      ALLERGIES:  No Known Allergies      MEDICATIONS:  MEDICATIONS  (STANDING):  aspirin  chewable 81 milliGRAM(s) Oral daily  enalapril 2.5 milliGRAM(s) Oral two times a day  heparin  Injectable 5000 Unit(s) SubCutaneous every 8 hours  lactated ringers. 1000 milliLiter(s) (100 mL/Hr) IV Continuous <Continuous>  melatonin 5 milliGRAM(s) Oral at bedtime  metoprolol succinate ER 50 milliGRAM(s) Oral daily  ticagrelor 90 milliGRAM(s) Oral two times a day    MEDICATIONS  (PRN):  morphine  - Injectable 4 milliGRAM(s) IV Push every 4 hours PRN Moderate Pain (4 - 6)      REVIEW OF SYSTEMS:    CONSTITUTIONAL: No weakness, fatigue, malaise, fevers or chills, no weight change, appetite change  Throat: No Dysphagia, No Odynophagia  RESPIRATORY: No SOB  CARDIOVASCULAR: No chest pain   Muscloskeletal: no joints pain  NEUROLOGICAL: No syncope or diziness  SKIN: No pruritis  Heme/Lymph: no LN enlargement         VITALS:   T(F): 97.8 (07-17 @ 07:34), Max: 100 (07-16 @ 23:35)  HR: 76 (07-17 @ 07:34) (74 - 103)  BP: 125/61 (07-17 @ 07:34) (106/67 - 140/74)  BP(mean): 104 (07-14 @ 22:22) (104 - 104)  RR: 18 (07-17 @ 07:34) (18 - 20)  SpO2: 99% (07-15 @ 08:05) (98% - 100%)        PHYSICAL EXAM:  EYES: No scleral icterus   LUNG: Clear to auscultation bilaterally; No rales, rhonchi, wheezing, or rubs  HEART: RRR; No murmurs  ABDOMEN: Soft, +BS, No Abdominal Tenderness, No guarding, No Jiménez Sign   Rectal Exam: not done     Blood Work :                        8.0    6.45  )-----------( 366      ( 17 Jul 2018 05:46 )             27.4       07-17    138  |  100  |  <3<L>  ----------------------------<  121<H>  4.7   |  25  |  <0.5<L>    Ca    8.4<L>      17 Jul 2018 05:46      CBC -  ( 17 Jul 2018 05:46 )  Hemoglobin : 8.0    CBC -  ( 16 Jul 2018 06:43 )  Hemoglobin : 8.0    CBC -  ( 15 Jul 2018 10:00 )  Hemoglobin : 8.3    CBC -  ( 14 Jul 2018 16:01 )  Hemoglobin : 8.9      LIVER FUNCTIONS - ( 17 Jul 2018 05:46 )  Alb: 3.0 [3.5 - 5.2] / Pro: 5.4 [6.0 - 8.0] / ALK PHOS: 108 [30 - 115] / ALT: 12 [0 - 41] / AST: 25 [0 - 41] / GGT: x     LIVER FUNCTIONS - ( 16 Jul 2018 02:23 )  Alb: 2.9 [3.5 - 5.2] / Pro: 4.9 [6.0 - 8.0] / ALK PHOS: 86 [30 - 115] / ALT: 7 [0 - 41] / AST: 15 [0 - 41] / GGT: x     LIVER FUNCTIONS - ( 15 Jul 2018 16:54 )  Alb: 2.8 [3.5 - 5.2] / Pro: 5.2 [6.0 - 8.0] / ALK PHOS: 90 [30 - 115] / ALT: 7 [0 - 41] / AST: 13 [0 - 41] / GGT: x     LIVER FUNCTIONS - ( 15 Jul 2018 10:00 )  Alb: 3.1 [3.5 - 5.2] / Pro: 5.4 [6.0 - 8.0] / ALK PHOS: 88 [30 - 115] / ALT: 7 [0 - 41] / AST: 13 [0 - 41] / GGT: x     LIVER FUNCTIONS - ( 14 Jul 2018 16:01 )  Alb: 3.7 [3.5 - 5.2] / Pro: 6.7 [6.0 - 8.0] / ALK PHOS: 111 [30 - 115] / ALT: 10 [0 - 41] / AST: 19 [0 - 41] / GGT: x         RADIOLOGY:
SUBJECTIVE:    Patient is a 58y old Male who presents with a chief complaint of lower abdominal pain (15 Jul 2018 01:27)    Currently admitted to medicine with the primary diagnosis of Pancreatitis     Today is hospital day 2d. This morning he is resting comfortably in bed and reports no new issues or overnight events. Reports feeling much better since admission. Tolerating diet, no emesis, nausea, or abdominal pain.     PAST MEDICAL & SURGICAL HISTORY  High cholesterol  Hypertension  Lung mass  Pancreatic mass  Myocardial infarction acute  No significant past surgical history    SOCIAL HISTORY:  Negative for smoking/alcohol/drug use.     ALLERGIES:  No Known Allergies    MEDICATIONS:  STANDING MEDICATIONS  aspirin  chewable 81 milliGRAM(s) Oral daily  enalapril 2.5 milliGRAM(s) Oral two times a day  heparin  Injectable 5000 Unit(s) SubCutaneous every 8 hours  lactated ringers. 1000 milliLiter(s) IV Continuous <Continuous>  metoprolol succinate ER 50 milliGRAM(s) Oral daily  ticagrelor 90 milliGRAM(s) Oral two times a day    PRN MEDICATIONS  morphine  - Injectable 4 milliGRAM(s) IV Push every 4 hours PRN    VITALS:   T(F): 99.2  HR: 99  BP: 106/67  RR: 18  SpO2: --    LABS:                        8.0    7.37  )-----------( 314      ( 16 Jul 2018 06:43 )             27.6     07-16    137  |  95<L>  |  6<L>  ----------------------------<  61<L>  4.3   |  21  |  0.5<L>    Ca    8.4<L>      16 Jul 2018 06:43    TPro  4.9<L>  /  Alb  2.9<L>  /  TBili  0.5  /  DBili  0.2  /  AST  15  /  ALT  7   /  AlkPhos  86  07-16      Urinalysis Basic - ( 15 Jul 2018 00:50 )    Color: Yellow / Appearance: Clear / SG: >=1.030 / pH: x  Gluc: x / Ketone: 40  / Bili: Negative / Urobili: 1.0   Blood: x / Protein: 30 / Nitrite: Negative   Leuk Esterase: Negative / RBC: x / WBC x   Sq Epi: x / Non Sq Epi: Few /HPF / Bacteria: occ /HPF      Culture - Urine (collected 15 Jul 2018 00:50)  Source: .Urine Clean Catch (Midstream)  Final Report (16 Jul 2018 11:49):    <10,000 CFU/ml    Normal Urogenital ruth present      CARDIAC MARKERS ( 14 Jul 2018 16:01 )  x     / <0.01 ng/mL / 46 U/L / x     / x          RADIOLOGY:    PHYSICAL EXAM:  GEN: No acute distress, Cachectic  LUNGS: Clear to auscultation bilaterally   HEART: S1/S2 present. RRR.   ABD: Soft, non-tender, non-distended. Bowel sounds present  EXT: NC/NC/NE/2+PP/MAHARAJ/Skin Intact.   NEURO: AAOX3    Intravenous access:   NG tube:   Jackson cathter:

## 2018-07-17 NOTE — DISCHARGE NOTE ADULT - NS AS DC STROKE DX YN
Before Your Surgery      Call your surgeon if there is any change in your health. This includes signs of a cold or flu (such as a sore throat, runny nose, cough, rash or fever).    Do not smoke, drink alcohol or take over the counter medicine (unless your surgeon or primary care doctor tells you to) for the 24 hours before and after surgery.    If you take prescribed drugs: Follow your doctor s orders about which medicines to take and which to stop until after surgery.    Eating and drinking prior to surgery: follow the instructions from your surgeon    Take a shower or bath the night before surgery. Use the soap your surgeon gave you to gently clean your skin. If you do not have soap from your surgeon, use your regular soap. Do not shave or scrub the surgery site.  Wear clean pajamas and have clean sheets on your bed.   
no

## 2018-07-17 NOTE — PROGRESS NOTE ADULT - ASSESSMENT
59 yo M pmh of HLD, HTN, alcohol abuse in the past( states quit several years ago), h/o recurrent alcoholic  pancreatitis,  recent MI s/p stent placement presented with acute onset abdominal pain of one day duration, found to have elevated lipase and CT abdomen showed pancreatic inflammation.  Pt denies recent alcohol intake.    1) Acute on chronic pancreatitis - resolved  Pt denies abd pain , tolerating diet. Pt did not recieve any pain meds overnight  Can discontinue IV fluids  rec low fat diet     2) ON CT Abdomen - Multiple loculated collections adjacent to pancreas.  Likely pseudocyst from prior pancreatitis episodes. (r/o walled off pancreatic necrosis)  No evidence of fever, leukocytosis, sepsis.  No need for antibiotics   currently pt is on dual antiplatelets, will treat conservatively at this point.  [facility] for further workup of Pancreatic collections    will discuss with attending. 59 yo M pmh of HLD, HTN, alcohol abuse in the past( states quit several years ago), h/o recurrent alcoholic  pancreatitis,  recent MI s/p stent placement presented with acute onset abdominal pain of one day duration, found to have elevated lipase and CT abdomen showed pancreatic inflammation.  Pt denies recent alcohol intake.    1) Acute on chronic pancreatitis - resolved  Pt denies abd pain , tolerating diet. Pt did not recieve any pain meds overnight  Can discontinue IV fluids  rec low fat diet   strict abstinence from alcohol     2) ON CT Abdomen - Multiple loculated collections adjacent to pancreas.  Likely pseudocyst from prior pancreatitis episodes. (r/o walled off pancreatic necrosis)  No evidence of fever, leukocytosis, sepsis.  No need for antibiotics   currently pt is on dual antiplatelets, will treat conservatively at this point.  Outpatient [facility] with [facility] for further workup of Pancreatic collections    will discuss with attending. 59 yo M pmh of HLD, HTN, alcohol abuse in the past( states quit several years ago), h/o recurrent alcoholic  pancreatitis,  recent MI s/p stent placement presented with acute onset abdominal pain of one day duration, found to have elevated lipase and CT abdomen showed pancreatic inflammation.  Pt denies recent alcohol intake.    1) Acute on chronic pancreatitis - resolving  Pt denies abd pain , tolerating diet. Pt did not recieve any pain meds overnight  Can discontinue IV fluids  rec low fat diet   strict abstinence from alcohol     2) ON CT Abdomen - Multiple loculated collections adjacent to pancreas.  Likely pseudocyst from prior pancreatitis episodes. (r/o walled off pancreatic necrosis)  No evidence of fever, leukocytosis, sepsis.  No need for antibiotics   currently pt is on dual antiplatelets, will treat conservatively at this point.  Outpatient follow [facility] with [facility] for further workup of Pancreatic collection

## 2018-07-17 NOTE — DISCHARGE NOTE ADULT - PLAN OF CARE
Resolution of symptoms and follow up with Gastroenterologist and Primary Care Patient needs outpatient follow up for questionable history of possible pancreatic mass and lung lesion on prior admission at [facility]. GI and surgery recommends CT Abdomen and Pelvis with Pancreatic Protocol and medicine recommends CT Chest with IV contrast to further characterize these lesions. Patient will follow up Gastroenterology and Primary Doctor upon discharge as soon as possible. Continue with increased oral hydration at home and return to the Emergency Room if symptoms reoccur. Please abstain from any alcohol consumption as this may have been a precipitant to this episode of Pancreatitis. Patient needs outpatient follow up for questionable history of possible pancreatic mass and lung lesion on prior admission at [facility]. GI and surgery recommends CT Abdomen and Pelvis with Pancreatic Protocol and MRCP. Internal Medicine recommends CT Chest with IV contrast to further characterize these lesions. Patient will follow up Gastroenterology and Primary Doctor upon discharge as soon as possible. Continue with increased oral hydration at home and return to the Emergency Room if symptoms reoccur. Please abstain from any alcohol consumption as this may have been a precipitant to this episode of Pancreatitis.

## 2018-07-17 NOTE — DISCHARGE NOTE ADULT - HOSPITAL COURSE
This is a 57 y/o male with past medical history of Myocardial Infarction s/p stent placement, alcohol abuse, hypertension, hyperlipidemia, and possible pancreatic and lung lesion. Presented to [facility] with 10/10 abdominal pain sharp radiating to the back at home. He was diagnosed with Acute on Chronic Pancreatitis. Lipase was elevated >600. Over a few days patient improved with aggressive fluid resuscitation with Lactated ringers and bowel rest. Patient within a few days was very hungry and lipase trended down. He is now medically stable and ready for outpatient follow up. Patient needs to see a Gastroenterologist to schedule a CT abdomen with Pancreatic protocol and with Primary Care doctor to do a CT Chest w/ contrast to rule out malignancy as well as continued medical management. He will be instructed to return to the Emergency Room if symptoms return or worsen. This is a 57 y/o male with past medical history of Myocardial Infarction s/p stent placement, alcohol abuse, hypertension, hyperlipidemia, and possible pancreatic and lung lesion. Presented to [facility] with 10/10 abdominal pain sharp radiating to the back at home. He was diagnosed with Acute on Chronic Pancreatitis. Lipase was elevated >600. Over a few days patient improved with aggressive fluid resuscitation with Lactated ringers and bowel rest. Patient within a few days was very hungry and lipase trended down. He is now medically stable and ready for outpatient follow up. Patient needs to see a Gastroenterologist to schedule a CT abdomen with Pancreatic protocol and MRCP and with Primary Care doctor to do a CT Chest w/ contrast to rule out malignancy as well as continued medical management. He will be instructed to return to the Emergency Room if symptoms return or worsen. This is a 59 y/o male with past medical history of Myocardial Infarction s/p stent placement, alcohol abuse, hypertension, hyperlipidemia, and possible pancreatic and lung lesion. Presented to [facility] with 10/10 abdominal pain sharp radiating to the back at home. He was diagnosed with Acute on Chronic Pancreatitis. Lipase was elevated >600. Over a few days patient improved with aggressive fluid resuscitation with Lactated ringers and bowel rest. Patient within a few days was very hungry, tolerated PO diet and lipase trended down. He is now medically stable and ready for outpatient follow up. Patient needs to see a Gastroenterologist to schedule a CT abdomen with Pancreatic protocol and MRCP and with Primary Care doctor to do a CT Chest w/ contrast to rule out malignancy as well as continued medical management. He will be instructed to return to the Emergency Room if symptoms return or worsen.

## 2018-07-17 NOTE — DISCHARGE NOTE ADULT - MEDICATION SUMMARY - MEDICATIONS TO STOP TAKING
I will STOP taking the medications listed below when I get home from the hospital:    Brilinta (ticagrelor) 90 mg oral tablet  -- 1 tab(s) by mouth 2 times a day    Metoprolol Succinate ER 50 mg oral tablet, extended release  -- 1 tab(s) by mouth once a day    enalapril 2.5 mg oral tablet  -- 1 tab(s) by mouth once a day

## 2018-07-17 NOTE — DISCHARGE NOTE ADULT - CARE PLAN
Principal Discharge DX:	Pancreatitis  Goal:	Resolution of symptoms and follow up with Gastroenterologist and Primary Care  Assessment and plan of treatment:	Patient needs outpatient follow up for questionable history of possible pancreatic mass and lung lesion on prior admission at [facility]. GI and surgery recommends CT Abdomen and Pelvis with Pancreatic Protocol and medicine recommends CT Chest with IV contrast to further characterize these lesions. Patient will follow up Gastroenterology and Primary Doctor upon discharge as soon as possible. Continue with increased oral hydration at home and return to the Emergency Room if symptoms reoccur. Please abstain from any alcohol consumption as this may have been a precipitant to this episode of Pancreatitis. Principal Discharge DX:	Pancreatitis  Goal:	Resolution of symptoms and follow up with Gastroenterologist and Primary Care  Assessment and plan of treatment:	Patient needs outpatient follow up for questionable history of possible pancreatic mass and lung lesion on prior admission at [facility]. GI and surgery recommends CT Abdomen and Pelvis with Pancreatic Protocol and MRCP. Internal Medicine recommends CT Chest with IV contrast to further characterize these lesions. Patient will follow up Gastroenterology and Primary Doctor upon discharge as soon as possible. Continue with increased oral hydration at home and return to the Emergency Room if symptoms reoccur. Please abstain from any alcohol consumption as this may have been a precipitant to this episode of Pancreatitis.

## 2018-07-17 NOTE — PROGRESS NOTE ADULT - ASSESSMENT
57y/o M w/ Pancreatitis & multiple fluid collections    PLAN:   - recommending outpt MRCP / CT with contrast

## 2018-07-17 NOTE — DISCHARGE NOTE ADULT - MEDICATION SUMMARY - MEDICATIONS TO TAKE
I will START or STAY ON the medications listed below when I get home from the hospital:    aspirin 81 mg oral tablet, chewable  -- 1 tab(s) by mouth once a day  -- Indication: For CAD (coronary artery disease)    enalapril 2.5 mg oral tablet  -- 1 tab(s) by mouth once a day  -- Indication: For CAD (coronary artery disease)    atorvastatin 80 mg oral tablet  -- 1 tab(s) by mouth once a day  -- Indication: For CAD (coronary artery disease)    Brilinta (ticagrelor) 90 mg oral tablet  -- 1 tab(s) by mouth 2 times a day  -- Indication: For CAD (coronary artery disease)    Metoprolol Succinate ER 50 mg oral tablet, extended release  -- 1 tab(s) by mouth once a day  -- Indication: For CAD (coronary artery disease)

## 2018-07-17 NOTE — DISCHARGE NOTE ADULT - PROVIDER TOKENS
TOKFER:'[id number]:'[id number]' TOKEN:'[id number]:'[id number]:'[id number]'

## 2018-07-17 NOTE — DISCHARGE NOTE ADULT - OTHER SIGNIFICANT FINDINGS
< from: CT Abdomen and Pelvis w/ IV Cont (07.14.18 @ 19:50) >  Extensive peripancreatic fat stranding and surrounding fluid, compatible   with pancreatitis and multiple loculated collections likely reflecting   pseudocysts. Superimposed infection is difficult to exclude.    Mild abdominopelvic ascites.    < end of copied text >

## 2018-07-17 NOTE — DISCHARGE NOTE ADULT - CARE PROVIDER_API CALL
Ajay Fernandes), Cardiovascular Disease; Internal Medicine; Interventional Cardiology  [address]  [address] [place] [address]  Phone: [phone]  Fax: [phone]    Bienvenido Gallo), Gastroenterology; Internal Medicine  [address] [place] [id number]  Phone: [phone]  Fax: [phone] Ajay Fernandes), Cardiovascular Disease; Internal Medicine; Interventional Cardiology  [address]  [address] [place] [address]  Phone: [phone]  Fax: [phone]    Bienvenido Gallo), Gastroenterology; Internal Medicine  [address] [place] [address]  Phone: [phone]  Fax: [phone]    Sandip Bolden), Critical Care Medicine; Geriatric Medicine; Internal Medicine; Pulmonary Disease  [address] [place] [address]  Phone: [phone]  Fax: [phone]

## 2018-07-17 NOTE — DISCHARGE NOTE ADULT - ADDITIONAL INSTRUCTIONS
Please follow up with gastroenterology (GI/stoamch doctor) and your primary care physician (pulm if recommended by PCP) within 1-2 weeks.   Eat low fat diet.  If symptoms recur please call your physician or return to ED.

## 2018-07-17 NOTE — DISCHARGE NOTE ADULT - PATIENT PORTAL LINK FT
You can access the [facility] Patient Portal, offered by [facility], by registering with the following website: [email or web address]

## 2020-03-12 ENCOUNTER — INPATIENT (INPATIENT)
Facility: HOSPITAL | Age: 60
LOS: 2 days | Discharge: HOME | End: 2020-03-15
Attending: STUDENT IN AN ORGANIZED HEALTH CARE EDUCATION/TRAINING PROGRAM | Admitting: STUDENT IN AN ORGANIZED HEALTH CARE EDUCATION/TRAINING PROGRAM
Payer: COMMERCIAL

## 2020-03-12 VITALS
HEART RATE: 110 BPM | WEIGHT: 143.08 LBS | RESPIRATION RATE: 18 BRPM | SYSTOLIC BLOOD PRESSURE: 126 MMHG | DIASTOLIC BLOOD PRESSURE: 91 MMHG | TEMPERATURE: 98 F | OXYGEN SATURATION: 95 %

## 2020-03-12 PROBLEM — I21.9 ACUTE MYOCARDIAL INFARCTION, UNSPECIFIED: Chronic | Status: ACTIVE | Noted: 2018-07-14

## 2020-03-12 PROBLEM — E78.00 PURE HYPERCHOLESTEROLEMIA, UNSPECIFIED: Chronic | Status: ACTIVE | Noted: 2018-07-14

## 2020-03-12 PROBLEM — I10 ESSENTIAL (PRIMARY) HYPERTENSION: Chronic | Status: ACTIVE | Noted: 2018-07-14

## 2020-03-12 LAB
ALBUMIN SERPL ELPH-MCNC: 3.9 G/DL — SIGNIFICANT CHANGE UP (ref 3.5–5.2)
ALP SERPL-CCNC: 78 U/L — SIGNIFICANT CHANGE UP (ref 30–115)
ALT FLD-CCNC: 8 U/L — SIGNIFICANT CHANGE UP (ref 0–41)
ANION GAP SERPL CALC-SCNC: 15 MMOL/L — HIGH (ref 7–14)
APTT BLD: 38.7 SEC — SIGNIFICANT CHANGE UP (ref 27–39.2)
AST SERPL-CCNC: 20 U/L — SIGNIFICANT CHANGE UP (ref 0–41)
BASOPHILS # BLD AUTO: 0.06 K/UL — SIGNIFICANT CHANGE UP (ref 0–0.2)
BASOPHILS NFR BLD AUTO: 0.6 % — SIGNIFICANT CHANGE UP (ref 0–1)
BILIRUB SERPL-MCNC: 0.5 MG/DL — SIGNIFICANT CHANGE UP (ref 0.2–1.2)
BUN SERPL-MCNC: 11 MG/DL — SIGNIFICANT CHANGE UP (ref 10–20)
CALCIUM SERPL-MCNC: 8.9 MG/DL — SIGNIFICANT CHANGE UP (ref 8.5–10.1)
CHLORIDE SERPL-SCNC: 100 MMOL/L — SIGNIFICANT CHANGE UP (ref 98–110)
CO2 SERPL-SCNC: 24 MMOL/L — SIGNIFICANT CHANGE UP (ref 17–32)
CREAT SERPL-MCNC: 0.8 MG/DL — SIGNIFICANT CHANGE UP (ref 0.7–1.5)
D DIMER BLD IA.RAPID-MCNC: 1899 NG/ML DDU — HIGH (ref 0–230)
EOSINOPHIL # BLD AUTO: 0.21 K/UL — SIGNIFICANT CHANGE UP (ref 0–0.7)
EOSINOPHIL NFR BLD AUTO: 2 % — SIGNIFICANT CHANGE UP (ref 0–8)
ETHANOL SERPL-MCNC: <10 MG/DL — SIGNIFICANT CHANGE UP
GLUCOSE SERPL-MCNC: 130 MG/DL — HIGH (ref 70–99)
HCT VFR BLD CALC: 33.3 % — LOW (ref 42–52)
HGB BLD-MCNC: 10.8 G/DL — LOW (ref 14–18)
IMM GRANULOCYTES NFR BLD AUTO: 0.6 % — HIGH (ref 0.1–0.3)
LIDOCAIN IGE QN: 290 U/L — HIGH (ref 7–60)
LYMPHOCYTES # BLD AUTO: 0.69 K/UL — LOW (ref 1.2–3.4)
LYMPHOCYTES # BLD AUTO: 6.5 % — LOW (ref 20.5–51.1)
MCHC RBC-ENTMCNC: 28 PG — SIGNIFICANT CHANGE UP (ref 27–31)
MCHC RBC-ENTMCNC: 32.4 G/DL — SIGNIFICANT CHANGE UP (ref 32–37)
MCV RBC AUTO: 86.3 FL — SIGNIFICANT CHANGE UP (ref 80–94)
MONOCYTES # BLD AUTO: 1.18 K/UL — HIGH (ref 0.1–0.6)
MONOCYTES NFR BLD AUTO: 11.1 % — HIGH (ref 1.7–9.3)
NEUTROPHILS # BLD AUTO: 8.42 K/UL — HIGH (ref 1.4–6.5)
NEUTROPHILS NFR BLD AUTO: 79.2 % — HIGH (ref 42.2–75.2)
NRBC # BLD: 0 /100 WBCS — SIGNIFICANT CHANGE UP (ref 0–0)
PLATELET # BLD AUTO: 262 K/UL — SIGNIFICANT CHANGE UP (ref 130–400)
POTASSIUM SERPL-MCNC: 4 MMOL/L — SIGNIFICANT CHANGE UP (ref 3.5–5)
POTASSIUM SERPL-SCNC: 4 MMOL/L — SIGNIFICANT CHANGE UP (ref 3.5–5)
PROT SERPL-MCNC: 6.3 G/DL — SIGNIFICANT CHANGE UP (ref 6–8)
RBC # BLD: 3.86 M/UL — LOW (ref 4.7–6.1)
RBC # FLD: 19.8 % — HIGH (ref 11.5–14.5)
SODIUM SERPL-SCNC: 139 MMOL/L — SIGNIFICANT CHANGE UP (ref 135–146)
TROPONIN T SERPL-MCNC: <0.01 NG/ML — SIGNIFICANT CHANGE UP
TROPONIN T SERPL-MCNC: <0.01 NG/ML — SIGNIFICANT CHANGE UP
WBC # BLD: 10.62 K/UL — SIGNIFICANT CHANGE UP (ref 4.8–10.8)
WBC # FLD AUTO: 10.62 K/UL — SIGNIFICANT CHANGE UP (ref 4.8–10.8)

## 2020-03-12 PROCEDURE — 71045 X-RAY EXAM CHEST 1 VIEW: CPT | Mod: 26

## 2020-03-12 PROCEDURE — 99285 EMERGENCY DEPT VISIT HI MDM: CPT

## 2020-03-12 PROCEDURE — 74018 RADEX ABDOMEN 1 VIEW: CPT | Mod: 26

## 2020-03-12 PROCEDURE — 71275 CT ANGIOGRAPHY CHEST: CPT | Mod: 26

## 2020-03-12 PROCEDURE — 93010 ELECTROCARDIOGRAM REPORT: CPT

## 2020-03-12 RX ORDER — INFLUENZA VIRUS VACCINE 15; 15; 15; 15 UG/.5ML; UG/.5ML; UG/.5ML; UG/.5ML
0.5 SUSPENSION INTRAMUSCULAR ONCE
Refills: 0 | Status: COMPLETED | OUTPATIENT
Start: 2020-03-12 | End: 2020-03-12

## 2020-03-12 RX ORDER — MORPHINE SULFATE 50 MG/1
4 CAPSULE, EXTENDED RELEASE ORAL ONCE
Refills: 0 | Status: DISCONTINUED | OUTPATIENT
Start: 2020-03-12 | End: 2020-03-12

## 2020-03-12 RX ORDER — METOPROLOL TARTRATE 50 MG
50 TABLET ORAL
Refills: 0 | Status: DISCONTINUED | OUTPATIENT
Start: 2020-03-12 | End: 2020-03-15

## 2020-03-12 RX ORDER — HEPARIN SODIUM 5000 [USP'U]/ML
1000 INJECTION INTRAVENOUS; SUBCUTANEOUS
Qty: 25000 | Refills: 0 | Status: DISCONTINUED | OUTPATIENT
Start: 2020-03-12 | End: 2020-03-13

## 2020-03-12 RX ORDER — DILTIAZEM HCL 120 MG
5 CAPSULE, EXT RELEASE 24 HR ORAL
Qty: 125 | Refills: 0 | Status: DISCONTINUED | OUTPATIENT
Start: 2020-03-12 | End: 2020-03-12

## 2020-03-12 RX ORDER — ATORVASTATIN CALCIUM 80 MG/1
80 TABLET, FILM COATED ORAL AT BEDTIME
Refills: 0 | Status: DISCONTINUED | OUTPATIENT
Start: 2020-03-12 | End: 2020-03-15

## 2020-03-12 RX ORDER — MORPHINE SULFATE 50 MG/1
2 CAPSULE, EXTENDED RELEASE ORAL ONCE
Refills: 0 | Status: DISCONTINUED | OUTPATIENT
Start: 2020-03-12 | End: 2020-03-12

## 2020-03-12 RX ORDER — SODIUM CHLORIDE 9 MG/ML
1000 INJECTION INTRAMUSCULAR; INTRAVENOUS; SUBCUTANEOUS
Refills: 0 | Status: DISCONTINUED | OUTPATIENT
Start: 2020-03-12 | End: 2020-03-12

## 2020-03-12 RX ORDER — NITROGLYCERIN 6.5 MG
0.4 CAPSULE, EXTENDED RELEASE ORAL ONCE
Refills: 0 | Status: COMPLETED | OUTPATIENT
Start: 2020-03-12 | End: 2020-03-12

## 2020-03-12 RX ORDER — FAMOTIDINE 10 MG/ML
20 INJECTION INTRAVENOUS ONCE
Refills: 0 | Status: COMPLETED | OUTPATIENT
Start: 2020-03-12 | End: 2020-03-12

## 2020-03-12 RX ORDER — DILTIAZEM HCL 120 MG
3 CAPSULE, EXT RELEASE 24 HR ORAL
Qty: 125 | Refills: 0 | Status: DISCONTINUED | OUTPATIENT
Start: 2020-03-12 | End: 2020-03-13

## 2020-03-12 RX ORDER — SODIUM CHLORIDE 9 MG/ML
1000 INJECTION INTRAMUSCULAR; INTRAVENOUS; SUBCUTANEOUS
Refills: 0 | Status: DISCONTINUED | OUTPATIENT
Start: 2020-03-12 | End: 2020-03-14

## 2020-03-12 RX ORDER — ASPIRIN/CALCIUM CARB/MAGNESIUM 324 MG
162 TABLET ORAL ONCE
Refills: 0 | Status: COMPLETED | OUTPATIENT
Start: 2020-03-12 | End: 2020-03-12

## 2020-03-12 RX ORDER — DILTIAZEM HCL 120 MG
5 CAPSULE, EXT RELEASE 24 HR ORAL ONCE
Refills: 0 | Status: COMPLETED | OUTPATIENT
Start: 2020-03-12 | End: 2020-03-12

## 2020-03-12 RX ORDER — DILTIAZEM HCL 120 MG
10 CAPSULE, EXT RELEASE 24 HR ORAL ONCE
Refills: 0 | Status: COMPLETED | OUTPATIENT
Start: 2020-03-12 | End: 2020-03-12

## 2020-03-12 RX ORDER — PANTOPRAZOLE SODIUM 20 MG/1
40 TABLET, DELAYED RELEASE ORAL
Refills: 0 | Status: DISCONTINUED | OUTPATIENT
Start: 2020-03-12 | End: 2020-03-15

## 2020-03-12 RX ORDER — ASPIRIN/CALCIUM CARB/MAGNESIUM 324 MG
81 TABLET ORAL DAILY
Refills: 0 | Status: DISCONTINUED | OUTPATIENT
Start: 2020-03-12 | End: 2020-03-15

## 2020-03-12 RX ORDER — MORPHINE SULFATE 50 MG/1
4 CAPSULE, EXTENDED RELEASE ORAL EVERY 4 HOURS
Refills: 0 | Status: DISCONTINUED | OUTPATIENT
Start: 2020-03-12 | End: 2020-03-15

## 2020-03-12 RX ORDER — SODIUM CHLORIDE 9 MG/ML
1000 INJECTION, SOLUTION INTRAVENOUS
Refills: 0 | Status: DISCONTINUED | OUTPATIENT
Start: 2020-03-12 | End: 2020-03-12

## 2020-03-12 RX ADMIN — Medication 10 MILLIGRAM(S): at 19:43

## 2020-03-12 RX ADMIN — MORPHINE SULFATE 4 MILLIGRAM(S): 50 CAPSULE, EXTENDED RELEASE ORAL at 22:38

## 2020-03-12 RX ADMIN — MORPHINE SULFATE 4 MILLIGRAM(S): 50 CAPSULE, EXTENDED RELEASE ORAL at 15:45

## 2020-03-12 RX ADMIN — Medication 3 MG/HR: at 21:34

## 2020-03-12 RX ADMIN — Medication 5 MG/HR: at 19:52

## 2020-03-12 RX ADMIN — MORPHINE SULFATE 2 MILLIGRAM(S): 50 CAPSULE, EXTENDED RELEASE ORAL at 14:08

## 2020-03-12 RX ADMIN — Medication 0.4 MILLIGRAM(S): at 12:34

## 2020-03-12 RX ADMIN — MORPHINE SULFATE 4 MILLIGRAM(S): 50 CAPSULE, EXTENDED RELEASE ORAL at 23:08

## 2020-03-12 RX ADMIN — FAMOTIDINE 20 MILLIGRAM(S): 10 INJECTION INTRAVENOUS at 12:55

## 2020-03-12 RX ADMIN — Medication 306 MILLIGRAM(S): at 18:35

## 2020-03-12 RX ADMIN — HEPARIN SODIUM 10 UNIT(S)/HR: 5000 INJECTION INTRAVENOUS; SUBCUTANEOUS at 19:17

## 2020-03-12 RX ADMIN — ATORVASTATIN CALCIUM 80 MILLIGRAM(S): 80 TABLET, FILM COATED ORAL at 21:34

## 2020-03-12 RX ADMIN — SODIUM CHLORIDE 125 MILLILITER(S): 9 INJECTION, SOLUTION INTRAVENOUS at 18:34

## 2020-03-12 RX ADMIN — MORPHINE SULFATE 4 MILLIGRAM(S): 50 CAPSULE, EXTENDED RELEASE ORAL at 18:42

## 2020-03-12 RX ADMIN — MORPHINE SULFATE 4 MILLIGRAM(S): 50 CAPSULE, EXTENDED RELEASE ORAL at 18:34

## 2020-03-12 RX ADMIN — MORPHINE SULFATE 4 MILLIGRAM(S): 50 CAPSULE, EXTENDED RELEASE ORAL at 17:13

## 2020-03-12 RX ADMIN — Medication 162 MILLIGRAM(S): at 12:34

## 2020-03-12 RX ADMIN — MORPHINE SULFATE 2 MILLIGRAM(S): 50 CAPSULE, EXTENDED RELEASE ORAL at 12:55

## 2020-03-12 NOTE — H&P ADULT - HISTORY OF PRESENT ILLNESS
60 years old male pt with past medical hx of HTN, DLD, cad S/P pci in the past on aspirin, h/o of recurrent alcoholic pancreatitis and active smoker came to ER because of chest pain that started today.  As per patient, he has pain in substernal area 10/10 in intensity sharp in nature, associated with SOB and palpitation, denies excessive sweating , no change with exertion or rest, improves with sitting and leaning forward worse with lying supine  He denies any fever, no preceding flu like symptoms , diarrhoea or vomiting.  he denies h/o of any autoimmune disease. He denies excessive alcohol intake, no illicit drug  He received 162 mg of aspirin ,while in ER, his initial set of cardiac enzyme was neg, ekg no st changes, later he became tachy to 130-140, his ekg showed AFIb with RVR

## 2020-03-12 NOTE — ED PROVIDER NOTE - OBJECTIVE STATEMENT
61 y/o male with PMH of GERD,  HTN, HLD, CAD s/p 1 stent placed one year ago on ASA/Brilinta, current everyday smoker no family history of hear disease who presents to ED for chest pain. Since this AM pt c/o constant sharp pain in the center of his chest, no radiation, diaphoresis, nausea or vomiting. Pain is worse with laying flat, no alleviating factors. Pt took ASSA 162 mg this AM called 911 and was brought to ED for evaluation. No trauma rash, recent fever, chills, cough, congestion.

## 2020-03-12 NOTE — H&P ADULT - NSICDXPASTMEDICALHX_GEN_ALL_CORE_FT
PAST MEDICAL HISTORY:  High cholesterol     Hypertension     Myocardial infarction acute     Recurrent pancreatitis

## 2020-03-12 NOTE — ED PROVIDER NOTE - ATTENDING CONTRIBUTION TO CARE
59 y/o m w/ pmhx of htn, MI, CAD s/p stent (summer of 2018, cardiologist in [facility]), pancreatic mass, smoker, reports cocaine use ~ 20 years ago, no recent use, presents for mid-sternal , pressure like chest pain, since last night, intermittent yesterday, constant today, worse with laying back, better leaning forward, pleuritic at times, non-radiating. took 162 mg of asa this morning and called for ambulance. reports heart rate in ambulance was 170's given dose of adenosine with no relief.   No fever, chills, n/v, sob,  palpitations, diaphoresis, cough, ha/lh/dizziness, numbness/tingling, neck pain/ stiffness, abd pain, diarrhea, constipation, melena/brbpr, urinary symptoms, trauma, weakness, edema, calf pain/swelling/erythema, sick contacts, recent travel or rash.     on exam: male pt sitting on stretcher speaking full sentences, no rash, mmm, neck supple. non-tender , radial pulses 2/4 b/l, no jvd, no pain to palpation to chest wall, no pain with movement/ not reproducible, ctabl w/ breath sounds present b/l, no wheezing or crackles, no accessory muscle use, no tachypnea, no stridor, bs present throughout all 4 quadrants, abd soft, nd, nt, no rebound tenderness or guarding, no cvat, FROM of ext, no calf pain/swelling/erythema, AAOx3. motor 5/5 and sensation intact throughout upper and lower ext. no focal deficits.

## 2020-03-12 NOTE — H&P ADULT - NSHPPHYSICALEXAM_GEN_ALL_CORE
Vital Signs Last 24 Hrs  T(C): 36.7 (12 Mar 2020 11:27), Max: 36.7 (12 Mar 2020 11:27)  T(F): 98 (12 Mar 2020 11:27), Max: 98 (12 Mar 2020 11:27)  HR: 110 (12 Mar 2020 11:27) (110 - 110)  BP: 126/91 (12 Mar 2020 11:27) (126/91 - 126/91)  RR: 18 (12 Mar 2020 11:27) (18 - 18)  SpO2: 95% (12 Mar 2020 11:27) (95% - 95%)    PHYSICAL EXAM:  GENERAL: NAD, speaks in full sentences, no signs of respiratory distress  EYES: EOMI, PERRLA, conjunctiva and sclera clear  NECK: Supple, No JVD  CHEST/LUNG: Clear to auscultation bilaterally;   HEART: irregular rhythm , chest tebnderness  ABDOMEN: epigastric tenderness  EXTREMITIES:  2+ Peripheral Pulses, No cyanosis or edema  PSYCH: AAOx3  NEUROLOGY: non-focal  SKIN: No rashes or lesions

## 2020-03-12 NOTE — H&P ADULT - NSICDXFAMILYHX_GEN_ALL_CORE_FT
FAMILY HISTORY:  FH: heart disease    Father  Still living? No  Family history of pancreatitis, Age at diagnosis: Age Unknown

## 2020-03-12 NOTE — ED PROVIDER NOTE - CLINICAL SUMMARY MEDICAL DECISION MAKING FREE TEXT BOX
ED work up reviewed and patient had elevated d-dimer. CT scan ordered.  CTA negative for aortic dissection or large PE, no pericardial effusion found. Troponin normal. Patient in A Fib RVR.  Will admit for rate control and further work up and evaluation, tele monitor, and serial Troponin. Patient remains hemodynamically stable and alert.  He does not warrant ICU evaluation or admission at this time.

## 2020-03-12 NOTE — ED ADULT NURSE NOTE - NS ED NURSE REPORT GIVEN DT
Next appt 1-31-19  Last appt 7-30-18    Refill request for   Disp Refills Start End    gabapentin (NEURONTIN) 300 MG capsule 90 capsule 1 6/27/2018     Sig - Route: Take 1 capsule by mouth nightly. - Oral      Refilled per standing med protocol.     12-Mar-2020 20:38

## 2020-03-12 NOTE — H&P ADULT - NSHPLABSRESULTS_GEN_ALL_CORE
03-12    139  |  100  |  11  ----------------------------<  130<H>  4.0   |  24  |  0.8    Ca    8.9      12 Mar 2020 12:06    TPro  6.3  /  Alb  3.9  /  TBili  0.5  /  DBili  x   /  AST  20  /  ALT  8   /  AlkPhos  78  03-12                          10.8   10.62 )-----------( 262      ( 12 Mar 2020 12:06 )             33.3     < from: CT Angio Chest w/ IV Cont (03.12.20 @ 16:53) >    IMPRESSION:     No CTA evidence of acute pulmonary embolus.    Centrilobular emphysema. Small bilateral pleural effusions.     < end of copied text >

## 2020-03-12 NOTE — ED PROVIDER NOTE - CARE PLAN
Assessment and plan of treatment:	Plan: Monitor, EKG, CXR, labs incuding d-dimer, nitro as pt with persistent chest pain, reassess. Principal Discharge DX:	Chest pain  Assessment and plan of treatment:	Plan: Monitor, EKG, CXR, labs incuding d-dimer, nitro as pt with persistent chest pain, reassess.

## 2020-03-12 NOTE — ED ADULT NURSE NOTE - NSIMPLEMENTINTERV_GEN_ALL_ED
Implemented All Fall with Harm Risk Interventions:  [place] to call system. Call bell, personal items and telephone within reach. Instruct patient to call for assistance. Room bathroom lighting operational. Non-slip footwear when patient is off stretcher. Physically safe environment: no spills, clutter or unnecessary equipment. Stretcher in lowest position, wheels locked, appropriate side rails in place. Provide visual cue, wrist band, yellow gown, etc. Monitor gait and stability. Monitor for mental status changes and reorient to person, place, and time. Review medications for side effects contributing to fall risk. Reinforce activity limits and safety measures with patient and family. Provide visual clues: red socks.

## 2020-03-12 NOTE — ED PROVIDER NOTE - PROGRESS NOTE DETAILS
Pt seen in the main, after initial equation pt upgraded to crit. Authored by Kym Duncan DO Jena: Patient upgraded to critical care due to ongoing pleuritic chest pain; Patient states the pain started last night, gradually worsening, constant in nature, mid-sternal, worse when laying flat and deep breaths. Admits to cocaine use many years ago. +CAD s/p 2 stents. +smoker. Will do labs, monitor, and pain control. d-dimer noted, cta chest for PE evaluation added, pt aware, chest pain improved, will continue to monitor and reassess.

## 2020-03-12 NOTE — ED PROVIDER NOTE - PHYSICAL EXAMINATION
CONSTITUTIONAL: Well-developed; well-nourished; uncomfortable grabbing chest c/o pain  SKIN: warm, dry  HEAD: Normocephalic; atraumatic.  EYES: no conjunctival injection. PERRL.   ENT: No nasal discharge; airway clear.  NECK: Supple; non tender.  CARD: S1, S2 normal; no murmurs, gallops, or rubs. tachycardic  RESP: No wheezes, rales or rhonchi.  ABD: soft ntnd  EXT: Normal ROM.  No clubbing, cyanosis or edema.   LYMPH: No acute cervical adenopathy.  NEURO: Alert, oriented, grossly unremarkable  PSYCH: Cooperative, appropriate.

## 2020-03-12 NOTE — ED ADULT NURSE REASSESSMENT NOTE - NS ED NURSE REASSESS COMMENT FT1
pt presents with chest pain. pt upgraded to crit. report given to rn. cardiac monitor maintained. safety measures maintained

## 2020-03-12 NOTE — H&P ADULT - ASSESSMENT
60 years old male pt with past medical hx of HTN, DLD, cad S/P pci in the past on aspirin, h/o of recurrent alcoholic pancreatitis and active smoker came to ER because of chest pain that started today.    # chest pain for evaluation     atypical in nature , ? pericardial in origin as it relieves with lean forward/ muscular reproduced on palpation      CTA negative for aortic dissection, no pericardial effusion     first set cardiac enzyme negative     EKG negative for st changes, AFib with RVR second EKG     will monitor serial troponin     will get lipase level     2d echocardiogram     continue with aspirin and atorvastatin for now     morphine PRN for pain     check lipid profile, hba1c      # new onset Afib     etiology ? MI/ hyperthyroid      will check TSH, echocardiogram     CHA2 DS 2vasc  2     will start heparin 60-80     continue with metoprolol     if HR > 110 will start iv cardizem drip     will get cardiology on board    # DLD     will continue statin     check lipid profile    # active smoker     will start nicotine    # GI prophylaxis     pantoprazole 40 mg daily    # DVT prophylaxis on heparin drip 60 years old male pt with past medical hx of HTN, DLD, cad S/P pci in the past on aspirin, h/o of recurrent alcoholic pancreatitis and active smoker came to ER because of chest pain that started today.    # chest pain for evaluation     atypical in nature , ? pericardial in origin as it relieves with lean forward/ muscular reproduced on palpation      CTA negative for aortic dissection, no pericardial effusion     first set cardiac enzyme negative     EKG negative for st changes, AFib with RVR second EKG     will monitor serial troponin     will get lipase level     2d echocardiogram     continue with aspirin and atorvastatin for now     morphine PRN for pain     check lipid profile, hba1c     will do urine toxicology      # new onset Afib     etiology ? MI/ hyperthyroid      will check TSH, echocardiogram     CHA2 DS 2vasc  2     will start heparin 60-80     continue with metoprolol     if HR > 110 will start iv cardizem drip     will get cardiology on board    # DLD     will continue statin     check lipid profile    # active smoker     will start nicotine    # GI prophylaxis     pantoprazole 40 mg daily    # DVT prophylaxis on heparin drip 60 years old male pt with past medical hx of HTN, DLD, cad S/P pci in the past on aspirin, h/o of recurrent alcoholic pancreatitis and active smoker came to ER because of chest pain that started today.    # chest pain for evaluation     atypical in nature , ? pericardial in origin as it relieves with lean forward/ muscular reproduced on palpation      ? pancreatitis due to irritation of diaphragm ( h/o of pseudocyst)     CTA negative for aortic dissection, no pericardial effusion     first set cardiac enzyme negative     EKG negative for st changes, AFib with RVR second EKG     will monitor serial troponin     will get lipase level     2d echocardiogram     continue with aspirin and atorvastatin for now     morphine PRN for pain     check lipid profile, hba1c     will do urine toxicology      # new onset Afib     etiology ? MI/ hyperthyroid      will check TSH, echocardiogram     CHA2 DS 2vasc  2     will start heparin 60-80     continue with metoprolol     if HR > 110 will start iv cardizem drip     will get cardiology on board    # DLD     will continue statin     check lipid profile    # active smoker     will start nicotine    # GI prophylaxis     pantoprazole 40 mg daily    # DVT prophylaxis on heparin drip

## 2020-03-12 NOTE — H&P ADULT - ATTENDING COMMENTS
I saw and examined the patient independently. I agree with above history, physical exam and plan of care which I have reviewed and edited where appropriate with following additions.     patient c/o mid sternal chest pain requiring IV morphine for last 3 days / poor oral intake but wants to try food for lunch.     chest pain with h/o CAD sp PCI in past/low suspicion for acute MI:   c/w telemonitoring- no events noted.   trops x3 neg. EKG with no ichemic changes- reviewed.  c/w morphine prn for pain/ dec to 2mg ivp q4hr if pain intensity decreases throughout the day today.   c/w asa/statin/lopressor.   Cardio consult - pending.   fu lipid profile/ HbA1c.    A-FIB with RVR:   rate better controlled in the morning.   c/w lopressor/cardizem current doses.   on heparin drip for AC/ will switch to NOACS on discharge if cardio agrees.  fu cardio recs.   TTE noted: normal LVSF , Ef >60%, mild to mod MR/small patent foramen oval  fu utox/TSH.      possible mild acute on chronic alcoholic pancreatitis;  unclear epigastric vs chest pain.   lipase mildly elevated/ no CT abdomen available- would not .   c/w IVF hydration  start clear diet for lunch- patient wants to try.   c/w morphine prn for pain/dec dose to 2mg ivp q4hr for possible drug seeking attitude.   c.w protonix.    HTN:   BP on lower side.  dced enalapril / now patient on  cardizem/lopressor for HR control.     Nicotine dependence/ETOH dependence:   c/w nicotine patch  smoking cessation advised for 10 minutes.  ETOH abstinence advised.      dvt ppx. I saw and examined the patient independently. I agree with above history, physical exam and plan of care which I have reviewed and edited where appropriate with following additions.     patient c/o mid sternal chest pain requiring IV morphine for last 3 days / poor oral intake but wants to try food for lunch.    Vital Signs Last 24 Hrs  T(C): 36.2 (13 Mar 2020 05:25), Max: 36.7 (12 Mar 2020 11:27)  T(F): 97.1 (13 Mar 2020 05:25), Max: 98 (12 Mar 2020 11:27)  HR: 92 (13 Mar 2020 05:25) (85 - 127)  BP: 99/59 (13 Mar 2020 05:25) (95/58 - 126/91)  BP(mean): 84 (12 Mar 2020 19:43) (84 - 84)  RR: 18 (13 Mar 2020 05:25) (18 - 18)  SpO2: 95% (12 Mar 2020 11:27) (95% - 95%)    Physical Examination:  General: AAO x 3 young male with multiple tatooes on skin   , NAD.  HEENT: PERRLA, EOMI. NO JVD.  CVS:  S1 & S2 appreciated, regular rate and rhythm  Lungs: clear to auscultation, no wheezing, no rales.   Abdominal Examination: soft, nontender, nondistended  Neuro: AAO x    . no focal deficits.   Extremity Examination: No edema peripherally.      chest pain with h/o CAD sp PCI in past/low suspicion for acute MI:   c/w telemonitoring- no events noted.   trops x3 neg. EKG with no ichemic changes- reviewed.  c/w morphine prn for pain/ dec to 2mg ivp q4hr if pain intensity decreases throughout the day today.   c/w asa/statin/lopressor.   Cardio consult - pending.   fu lipid profile/ HbA1c.    A-FIB with RVR:   rate better controlled in the morning.   c/w lopressor/cardizem current doses.   on heparin drip for AC/ will switch to NOACS on discharge if cardio agrees.  fu cardio recs.   TTE noted: normal LVSF , Ef >60%, mild to mod MR/small patent foramen oval  fu utox/TSH.      possible mild acute on chronic alcoholic pancreatitis;  unclear epigastric vs chest pain.   lipase mildly elevated/ no CT abdomen available- would not .   c/w IVF hydration  start clear diet for lunch- patient wants to try.   c/w morphine prn for pain/dec dose to 2mg ivp q4hr for possible drug seeking attitude.   c.w protonix.    HTN:   BP on lower side.  dced enalapril / now patient on  cardizem/lopressor for HR control.     Nicotine dependence/ETOH dependence:   c/w nicotine patch  smoking cessation advised for 10 minutes.  ETOH abstinence advised.      dvt ppx.

## 2020-03-13 LAB
ALBUMIN SERPL ELPH-MCNC: 3 G/DL — LOW (ref 3.5–5.2)
ALP SERPL-CCNC: 60 U/L — SIGNIFICANT CHANGE UP (ref 30–115)
ALT FLD-CCNC: 6 U/L — SIGNIFICANT CHANGE UP (ref 0–41)
AMYLASE P1 CFR SERPL: 269 U/L — HIGH (ref 25–115)
ANION GAP SERPL CALC-SCNC: 10 MMOL/L — SIGNIFICANT CHANGE UP (ref 7–14)
ANION GAP SERPL CALC-SCNC: 11 MMOL/L — SIGNIFICANT CHANGE UP (ref 7–14)
APTT BLD: 38.5 SEC — SIGNIFICANT CHANGE UP (ref 27–39.2)
APTT BLD: 55.6 SEC — HIGH (ref 27–39.2)
APTT BLD: 59.2 SEC — HIGH (ref 27–39.2)
AST SERPL-CCNC: 15 U/L — SIGNIFICANT CHANGE UP (ref 0–41)
BILIRUB SERPL-MCNC: 0.5 MG/DL — SIGNIFICANT CHANGE UP (ref 0.2–1.2)
BUN SERPL-MCNC: 10 MG/DL — SIGNIFICANT CHANGE UP (ref 10–20)
BUN SERPL-MCNC: 11 MG/DL — SIGNIFICANT CHANGE UP (ref 10–20)
CALCIUM SERPL-MCNC: 7.2 MG/DL — LOW (ref 8.5–10.1)
CALCIUM SERPL-MCNC: 7.7 MG/DL — LOW (ref 8.5–10.1)
CHLORIDE SERPL-SCNC: 102 MMOL/L — SIGNIFICANT CHANGE UP (ref 98–110)
CHLORIDE SERPL-SCNC: 106 MMOL/L — SIGNIFICANT CHANGE UP (ref 98–110)
CHOLEST SERPL-MCNC: 70 MG/DL — LOW (ref 100–200)
CK SERPL-CCNC: 50 U/L — SIGNIFICANT CHANGE UP (ref 0–225)
CO2 SERPL-SCNC: 23 MMOL/L — SIGNIFICANT CHANGE UP (ref 17–32)
CO2 SERPL-SCNC: 24 MMOL/L — SIGNIFICANT CHANGE UP (ref 17–32)
CREAT SERPL-MCNC: <0.5 MG/DL — LOW (ref 0.7–1.5)
CREAT SERPL-MCNC: <0.5 MG/DL — LOW (ref 0.7–1.5)
ESTIMATED AVERAGE GLUCOSE: 108 MG/DL — SIGNIFICANT CHANGE UP (ref 68–114)
GLUCOSE SERPL-MCNC: 120 MG/DL — HIGH (ref 70–99)
GLUCOSE SERPL-MCNC: 92 MG/DL — SIGNIFICANT CHANGE UP (ref 70–99)
HBA1C BLD-MCNC: 5.4 % — SIGNIFICANT CHANGE UP (ref 4–5.6)
HCT VFR BLD CALC: 27.3 % — LOW (ref 42–52)
HCV AB S/CO SERPL IA: 0.16 S/CO — SIGNIFICANT CHANGE UP (ref 0–0.99)
HCV AB SERPL-IMP: SIGNIFICANT CHANGE UP
HDLC SERPL-MCNC: 23 MG/DL — LOW
HGB BLD-MCNC: 8.6 G/DL — LOW (ref 14–18)
LIPID PNL WITH DIRECT LDL SERPL: 32 MG/DL — SIGNIFICANT CHANGE UP (ref 4–129)
MAGNESIUM SERPL-MCNC: 1.5 MG/DL — LOW (ref 1.8–2.4)
MAGNESIUM SERPL-MCNC: 1.9 MG/DL — SIGNIFICANT CHANGE UP (ref 1.8–2.4)
MCHC RBC-ENTMCNC: 26.8 PG — LOW (ref 27–31)
MCHC RBC-ENTMCNC: 31.5 G/DL — LOW (ref 32–37)
MCV RBC AUTO: 85 FL — SIGNIFICANT CHANGE UP (ref 80–94)
NRBC # BLD: 0 /100 WBCS — SIGNIFICANT CHANGE UP (ref 0–0)
PHOSPHATE SERPL-MCNC: 3 MG/DL — SIGNIFICANT CHANGE UP (ref 2.1–4.9)
PLATELET # BLD AUTO: 187 K/UL — SIGNIFICANT CHANGE UP (ref 130–400)
POTASSIUM SERPL-MCNC: 3.1 MMOL/L — LOW (ref 3.5–5)
POTASSIUM SERPL-MCNC: 3.5 MMOL/L — SIGNIFICANT CHANGE UP (ref 3.5–5)
POTASSIUM SERPL-SCNC: 3.1 MMOL/L — LOW (ref 3.5–5)
POTASSIUM SERPL-SCNC: 3.5 MMOL/L — SIGNIFICANT CHANGE UP (ref 3.5–5)
PROT SERPL-MCNC: 5.1 G/DL — LOW (ref 6–8)
RBC # BLD: 3.21 M/UL — LOW (ref 4.7–6.1)
RBC # FLD: 19.4 % — HIGH (ref 11.5–14.5)
SODIUM SERPL-SCNC: 136 MMOL/L — SIGNIFICANT CHANGE UP (ref 135–146)
SODIUM SERPL-SCNC: 140 MMOL/L — SIGNIFICANT CHANGE UP (ref 135–146)
TOTAL CHOLESTEROL/HDL RATIO MEASUREMENT: 3 RATIO — LOW (ref 4–5.5)
TRIGL SERPL-MCNC: 58 MG/DL — SIGNIFICANT CHANGE UP (ref 10–149)
TROPONIN T SERPL-MCNC: <0.01 NG/ML — SIGNIFICANT CHANGE UP
TROPONIN T SERPL-MCNC: <0.01 NG/ML — SIGNIFICANT CHANGE UP
TSH SERPL-MCNC: 1.67 UIU/ML — SIGNIFICANT CHANGE UP (ref 0.27–4.2)
VIT B12 SERPL-MCNC: 265 PG/ML — SIGNIFICANT CHANGE UP (ref 232–1245)
WBC # BLD: 7.36 K/UL — SIGNIFICANT CHANGE UP (ref 4.8–10.8)
WBC # FLD AUTO: 7.36 K/UL — SIGNIFICANT CHANGE UP (ref 4.8–10.8)

## 2020-03-13 PROCEDURE — 99223 1ST HOSP IP/OBS HIGH 75: CPT

## 2020-03-13 PROCEDURE — 93306 TTE W/DOPPLER COMPLETE: CPT | Mod: 26

## 2020-03-13 PROCEDURE — 99222 1ST HOSP IP/OBS MODERATE 55: CPT

## 2020-03-13 RX ORDER — DILTIAZEM HCL 120 MG
30 CAPSULE, EXT RELEASE 24 HR ORAL EVERY 8 HOURS
Refills: 0 | Status: DISCONTINUED | OUTPATIENT
Start: 2020-03-13 | End: 2020-03-14

## 2020-03-13 RX ORDER — HEPARIN SODIUM 5000 [USP'U]/ML
1100 INJECTION INTRAVENOUS; SUBCUTANEOUS
Qty: 25000 | Refills: 0 | Status: DISCONTINUED | OUTPATIENT
Start: 2020-03-13 | End: 2020-03-15

## 2020-03-13 RX ORDER — MAGNESIUM SULFATE 500 MG/ML
2 VIAL (ML) INJECTION ONCE
Refills: 0 | Status: COMPLETED | OUTPATIENT
Start: 2020-03-13 | End: 2020-03-13

## 2020-03-13 RX ADMIN — MORPHINE SULFATE 4 MILLIGRAM(S): 50 CAPSULE, EXTENDED RELEASE ORAL at 11:46

## 2020-03-13 RX ADMIN — MORPHINE SULFATE 4 MILLIGRAM(S): 50 CAPSULE, EXTENDED RELEASE ORAL at 11:03

## 2020-03-13 RX ADMIN — HEPARIN SODIUM 10 UNIT(S)/HR: 5000 INJECTION INTRAVENOUS; SUBCUTANEOUS at 11:05

## 2020-03-13 RX ADMIN — ATORVASTATIN CALCIUM 80 MILLIGRAM(S): 80 TABLET, FILM COATED ORAL at 22:14

## 2020-03-13 RX ADMIN — MORPHINE SULFATE 4 MILLIGRAM(S): 50 CAPSULE, EXTENDED RELEASE ORAL at 03:35

## 2020-03-13 RX ADMIN — HEPARIN SODIUM 10 UNIT(S)/HR: 5000 INJECTION INTRAVENOUS; SUBCUTANEOUS at 17:54

## 2020-03-13 RX ADMIN — Medication 30 MILLIGRAM(S): at 22:14

## 2020-03-13 RX ADMIN — Medication 50 GRAM(S): at 10:32

## 2020-03-13 RX ADMIN — MORPHINE SULFATE 4 MILLIGRAM(S): 50 CAPSULE, EXTENDED RELEASE ORAL at 07:36

## 2020-03-13 RX ADMIN — Medication 81 MILLIGRAM(S): at 11:05

## 2020-03-13 RX ADMIN — SODIUM CHLORIDE 150 MILLILITER(S): 9 INJECTION INTRAMUSCULAR; INTRAVENOUS; SUBCUTANEOUS at 17:54

## 2020-03-13 RX ADMIN — Medication 30 MILLIGRAM(S): at 14:14

## 2020-03-13 RX ADMIN — MORPHINE SULFATE 4 MILLIGRAM(S): 50 CAPSULE, EXTENDED RELEASE ORAL at 03:05

## 2020-03-13 RX ADMIN — Medication 50 MILLIGRAM(S): at 05:58

## 2020-03-13 RX ADMIN — Medication 30 MILLIGRAM(S): at 05:54

## 2020-03-13 RX ADMIN — MORPHINE SULFATE 4 MILLIGRAM(S): 50 CAPSULE, EXTENDED RELEASE ORAL at 07:06

## 2020-03-13 NOTE — CONSULT NOTE ADULT - SUBJECTIVE AND OBJECTIVE BOX
HPI:  60 years old male pt with past medical hx of HTN, DLD, cad S/P pci in the past on aspirin, h/o of recurrent alcoholic pancreatitis and active smoker came to ER because of chest pain that started today.  As per patient, he has pain in substernal area 10/10 in intensity sharp in nature, associated with SOB and palpitation, denies excessive sweating , no change with exertion or rest, improves with sitting and leaning forward worse with lying supine  He denies any fever, no preceding flu like symptoms , diarrhoea or vomiting.  he denies h/o of any autoimmune disease. He denies excessive alcohol intake, no illicit drug  He received 162 mg of aspirin ,while in ER, his initial set of cardiac enzyme was neg, ekg no st changes, later he became tachy to 130-140, his ekg showed AFIb with RVR (12 Mar 2020 18:30)      PAST MEDICAL & SURGICAL HISTORY  Recurrent pancreatitis  High cholesterol  Hypertension  Myocardial infarction acute  No significant past surgical history      FAMILY HISTORY:  FAMILY HISTORY:  FH: heart disease  Family history of pancreatitis (Father)      SOCIAL HISTORY:  [x]Current smoker  []Alcohol  []Drug    ALLERGIES:  No Known Allergies      MEDICATIONS:  MEDICATIONS  (STANDING):  aspirin  chewable 81 milliGRAM(s) Oral daily  atorvastatin 80 milliGRAM(s) Oral at bedtime  diltiazem    Tablet 30 milliGRAM(s) Oral every 8 hours  heparin  Infusion 1000 Unit(s)/Hr (10 mL/Hr) IV Continuous <Continuous>  influenza   Vaccine 0.5 milliLiter(s) IntraMuscular once  metoprolol tartrate 50 milliGRAM(s) Oral two times a day  pantoprazole    Tablet 40 milliGRAM(s) Oral before breakfast  sodium chloride 0.9%. 1000 milliLiter(s) (150 mL/Hr) IV Continuous <Continuous>    MEDICATIONS  (PRN):  morphine  - Injectable 4 milliGRAM(s) IV Push every 4 hours PRN Severe Pain (7 - 10)    HOME MEDICATIONS:  Home Medications:  aspirin 81 mg oral tablet, chewable: 1 tab(s) orally once a day (17 Jul 2018 10:03)  atorvastatin 80 mg oral tablet: 1 tab(s) orally once a day (14 Jul 2018 16:46)  Brilinta (ticagrelor) 90 mg oral tablet: 1 tab(s) orally 2 times a day (14 Jul 2018 16:46)  enalapril 2.5 mg oral tablet: 1 tab(s) orally once a day (14 Jul 2018 16:46)  Metoprolol Succinate ER 50 mg oral tablet, extended release: 1 tab(s) orally once a day (14 Jul 2018 16:46)      VITALS:   T(F): 97.1 (03-13 @ 05:25), Max: 98 (03-12 @ 11:27)  HR: 92 (03-13 @ 05:25) (85 - 127)  BP: 99/59 (03-13 @ 05:25) (95/58 - 126/91)  BP(mean): 84 (03-12 @ 19:43) (84 - 84)  RR: 18 (03-13 @ 05:25) (18 - 18)  SpO2: 95% (03-12 @ 11:27) (95% - 95%)    I&O's Summary    12 Mar 2020 07:01  -  13 Mar 2020 07:00  --------------------------------------------------------  IN: 815 mL / OUT: 0 mL / NET: 815 mL    13 Mar 2020 07:01  -  13 Mar 2020 13:51  --------------------------------------------------------  IN: 0 mL / OUT: 600 mL / NET: -600 mL        REVIEW OF SYSTEMS:  CONSTITUTIONAL: No weakness, fevers or chills  EYES: No visual changes  ENT: No vertigo or throat pain   NECK: No pain or stiffness  RESPIRATORY: No cough, wheezing, hemoptysis; No shortness of breath  CARDIOVASCULAR: No chest pain or palpitations  GASTROINTESTINAL: No abdominal or epigastric pain. No nausea, vomiting, or hematemesis; No diarrhea or constipation. No melena or hematochezia.  GENITOURINARY: No dysuria, frequency or hematuria  NEUROLOGICAL: No numbness or weakness  SKIN: No itching, no rashes  MSK: no    PHYSICAL EXAM:  NEURO: patient is awake , alert and oriented  GEN: Not in acute distress  NECK: no thyroid enlargement, no JVD  LUNGS: Clear to auscultation bilaterally   CARDIOVASCULAR: S1/S2 present, RRR, no murmurs or rubs, no carotid bruits,  + PP bilaterally  ABD: Soft, non-tender, non-distended, +BS  EXT: No DAMION   SKIN: Intact    LABS:                        8.6    7.36  )-----------( 187      ( 13 Mar 2020 05:53 )             27.3     03-13    140  |  106  |  11  ----------------------------<  92  3.5   |  23  |  <0.5<L>    Ca    7.7<L>      13 Mar 2020 05:53  Phos  3.0     03-13  Mg     1.5     03-13    TPro  5.1<L>  /  Alb  3.0<L>  /  TBili  0.5  /  DBili  x   /  AST  15  /  ALT  6   /  AlkPhos  60  03-13    PTT - ( 13 Mar 2020 11:06 )  PTT:55.6 sec  Creatine Kinase, Serum: 50 U/L (03-13-20 @ 05:53)  Troponin T, Serum: <0.01 ng/mL (03-13-20 @ 05:53)  Troponin T, Serum: <0.01 ng/mL (03-13-20 @ 00:24)  Troponin T, Serum: <0.01 ng/mL (03-12-20 @ 21:23)    CARDIAC MARKERS ( 13 Mar 2020 05:53 )  x     / <0.01 ng/mL / 50 U/L / x     / x      CARDIAC MARKERS ( 13 Mar 2020 00:24 )  x     / <0.01 ng/mL / x     / x     / x      CARDIAC MARKERS ( 12 Mar 2020 21:23 )  x     / <0.01 ng/mL / x     / x     / x      CARDIAC MARKERS ( 12 Mar 2020 12:06 )  x     / <0.01 ng/mL / x     / x     / x          03-13 Chol 70<L> LDL 32 HDL 23<L> Trig 58      RADIOLOGY:  -CXR:      XR CHEST PORTABLE IMMED 1V            PROCEDURE DATE:  03/12/2020      Impression:      No radiographic evidence of acute cardiopulmonary disease.    -CTC:   CT ANGIO CHEST (W)AW IC            PROCEDURE DATE:  03/12/2020      IMPRESSION:     No CTA evidence of acute pulmonary embolus.    Centrilobular emphysema. Small bilateral pleural effusions.     -TTE:    2-D ECHO (TTE) COMPLETE        PROCEDURE DATE:  03/13/2020      Summary:   1. LV Ejection Fraction by Briceno's Method with a biplane EF of 64 %.   2. Normal left ventricular internal cavity size.   3. Normal right atrial size.   4. There is no evidence of pericardial effusion.   5. Mild mitral annular calcification.   6.Mild thickening and calcification of the anterior and posterior mitral valve leaflets.   7. Mild to moderate mitral valve regurgitation.   8. Small patent foramen ovale, with predominantly right to left shunting across the atrial septum.   9. Color Doppler demonstrates the presence of a shunt at the Atrial level.    -CCTA:  None        -STRESS TEST:  None       -CATHETERIZATION:      ECG:    Diagnosis Line Atrial fibrillation with rapid ventricular response  Low voltage QRS  Abnormal ECG    Confirmed by Perry Hammer ([id number]) on 3/12/2020 10:09:26 PM      TELEMETRY EVENTS: HPI:  60 years old male pt with past medical hx of HTN, DLD, cad S/P pci in the past on aspirin, h/o of recurrent alcoholic pancreatitis and active smoker came to ER because of chest pain that started today.  As per patient, he has pain in substernal area 10/10 in intensity sharp in nature, associated with SOB and palpitation, denies excessive sweating , no change with exertion or rest, improves with sitting and leaning forward worse with lying supine  He denies any fever, no preceding flu like symptoms , diarrhoea or vomiting.  he denies h/o of any autoimmune disease. He denies excessive alcohol intake, no illicit drug  He received 162 mg of aspirin ,while in ER, his initial set of cardiac enzyme was neg, ekg no st changes, later he became tachy to 130-140, his ekg showed AFIb with RVR (12 Mar 2020 18:30)      PAST MEDICAL & SURGICAL HISTORY  Recurrent pancreatitis  High cholesterol  Hypertension  Myocardial infarction acute  No significant past surgical history      FAMILY HISTORY:  FAMILY HISTORY:  FH: heart disease  Family history of pancreatitis (Father)      SOCIAL HISTORY:  [x]Current smoker  []Alcohol  []Hx of IV Drug Use, remote     ALLERGIES:  No Known Allergies      MEDICATIONS:  MEDICATIONS  (STANDING):  aspirin  chewable 81 milliGRAM(s) Oral daily  atorvastatin 80 milliGRAM(s) Oral at bedtime  diltiazem    Tablet 30 milliGRAM(s) Oral every 8 hours  heparin  Infusion 1000 Unit(s)/Hr (10 mL/Hr) IV Continuous <Continuous>  influenza   Vaccine 0.5 milliLiter(s) IntraMuscular once  metoprolol tartrate 50 milliGRAM(s) Oral two times a day  pantoprazole    Tablet 40 milliGRAM(s) Oral before breakfast  sodium chloride 0.9%. 1000 milliLiter(s) (150 mL/Hr) IV Continuous <Continuous>    MEDICATIONS  (PRN):  morphine  - Injectable 4 milliGRAM(s) IV Push every 4 hours PRN Severe Pain (7 - 10)    HOME MEDICATIONS:  Home Medications:  aspirin 81 mg oral tablet, chewable: 1 tab(s) orally once a day (17 Jul 2018 10:03)  atorvastatin 80 mg oral tablet: 1 tab(s) orally once a day (14 Jul 2018 16:46)  Brilinta (ticagrelor) 90 mg oral tablet: 1 tab(s) orally 2 times a day (14 Jul 2018 16:46)  enalapril 2.5 mg oral tablet: 1 tab(s) orally once a day (14 Jul 2018 16:46)  Metoprolol Succinate ER 50 mg oral tablet, extended release: 1 tab(s) orally once a day (14 Jul 2018 16:46)      VITALS:   T(F): 97.1 (03-13 @ 05:25), Max: 98 (03-12 @ 11:27)  HR: 92 (03-13 @ 05:25) (85 - 127)  BP: 99/59 (03-13 @ 05:25) (95/58 - 126/91)  BP(mean): 84 (03-12 @ 19:43) (84 - 84)  RR: 18 (03-13 @ 05:25) (18 - 18)  SpO2: 95% (03-12 @ 11:27) (95% - 95%)    I&O's Summary    12 Mar 2020 07:01  -  13 Mar 2020 07:00  --------------------------------------------------------  IN: 815 mL / OUT: 0 mL / NET: 815 mL    13 Mar 2020 07:01  -  13 Mar 2020 13:51  --------------------------------------------------------  IN: 0 mL / OUT: 600 mL / NET: -600 mL        REVIEW OF SYSTEMS:  CONSTITUTIONAL: No weakness, fevers or chills  EYES: No visual changes  ENT: No vertigo or throat pain   NECK: No pain or stiffness  RESPIRATORY: No cough, wheezing, hemoptysis; No shortness of breath  CARDIOVASCULAR: No chest pain or palpitations  GASTROINTESTINAL: No abdominal or epigastric pain. No nausea, vomiting, or hematemesis; No diarrhea or constipation. No melena or hematochezia.  GENITOURINARY: No dysuria, frequency or hematuria  NEUROLOGICAL: No numbness or weakness  SKIN: No itching, no rashes  MSK: no    PHYSICAL EXAM:  NEURO: patient is awake , alert and oriented  GEN: Not in acute distress  NECK: no thyroid enlargement, no JVD  LUNGS: Clear to auscultation bilaterally   CARDIOVASCULAR: S1/S2 present, RRR, no murmurs or rubs, no carotid bruits,  + PP bilaterally  ABD: Soft, non-tender, non-distended, +BS  EXT: No DAMION   SKIN: Intact    LABS:                        8.6    7.36  )-----------( 187      ( 13 Mar 2020 05:53 )             27.3     03-13    140  |  106  |  11  ----------------------------<  92  3.5   |  23  |  <0.5<L>    Ca    7.7<L>      13 Mar 2020 05:53  Phos  3.0     03-13  Mg     1.5     03-13    TPro  5.1<L>  /  Alb  3.0<L>  /  TBili  0.5  /  DBili  x   /  AST  15  /  ALT  6   /  AlkPhos  60  03-13    PTT - ( 13 Mar 2020 11:06 )  PTT:55.6 sec  Creatine Kinase, Serum: 50 U/L (03-13-20 @ 05:53)  Troponin T, Serum: <0.01 ng/mL (03-13-20 @ 05:53)  Troponin T, Serum: <0.01 ng/mL (03-13-20 @ 00:24)  Troponin T, Serum: <0.01 ng/mL (03-12-20 @ 21:23)    CARDIAC MARKERS ( 13 Mar 2020 05:53 )  x     / <0.01 ng/mL / 50 U/L / x     / x      CARDIAC MARKERS ( 13 Mar 2020 00:24 )  x     / <0.01 ng/mL / x     / x     / x      CARDIAC MARKERS ( 12 Mar 2020 21:23 )  x     / <0.01 ng/mL / x     / x     / x      CARDIAC MARKERS ( 12 Mar 2020 12:06 )  x     / <0.01 ng/mL / x     / x     / x          03-13 Chol 70<L> LDL 32 HDL 23<L> Trig 58      RADIOLOGY:  -CXR:      XR CHEST PORTABLE IMMED 1V            PROCEDURE DATE:  03/12/2020      Impression:      No radiographic evidence of acute cardiopulmonary disease.    -CTC:   CT ANGIO CHEST (W)AW IC            PROCEDURE DATE:  03/12/2020      IMPRESSION:     No CTA evidence of acute pulmonary embolus.    Centrilobular emphysema. Small bilateral pleural effusions.     -TTE:    2-D ECHO (TTE) COMPLETE        PROCEDURE DATE:  03/13/2020      Summary:   1. LV Ejection Fraction by Briceno's Method with a biplane EF of 64 %.   2. Normal left ventricular internal cavity size.   3. Normal right atrial size.   4. There is no evidence of pericardial effusion.   5. Mild mitral annular calcification.   6.Mild thickening and calcification of the anterior and posterior mitral valve leaflets.   7. Mild to moderate mitral valve regurgitation.   8. Small patent foramen ovale, with predominantly right to left shunting across the atrial septum.   9. Color Doppler demonstrates the presence of a shunt at the Atrial level.    -CCTA:  None        -STRESS TEST:  None       -CATHETERIZATION:      ECG:    Diagnosis Line Atrial fibrillation with rapid ventricular response  Low voltage QRS  Abnormal ECG    Confirmed by Perry Hammer ([id number]) on 3/12/2020 10:09:26 PM      TELEMETRY EVENTS:

## 2020-03-13 NOTE — CONSULT NOTE ADULT - ASSESSMENT
60 years old male pt with past medical hx of HTN, DLD, CAD S/P pci in the past on aspirin, h/o of recurrent alcoholic pancreatitis and active smoker came to ER because of chest pain that started today.    IMPRESSION:    HTN  DLD  CAD s/p PCI  A New onset AFib, currently in NSR   Mild to moderate mitral valve regurgitation  Small patent foramen ovale, R->L Shunt     RECOMMENDATIONS:    Check TSH, troponin negative, EKG without ischemic changes   [id number], transition to NOAC  Continue Metoprolol   Continue current management   Outpatient follow up 60 years old male pt with past medical hx of HTN, DLD, CAD S/P pci in the past on aspirin, h/o of recurrent alcoholic pancreatitis and active smoker came to ER because of chest pain that started today.    IMPRESSION:    HTN  DLD  CAD s/p PCI  A New onset AFib, currently in NSR   Mild to moderate mitral valve regurgitation  Small patent foramen ovale, R->L Shunt     RECOMMENDATIONS:    Check TSH, troponin negative, EKG without ischemic changes   [id number] (HTN, previous MI),  transition to NOAC  Continue Metoprolol, Cardizem   Continue current management   Outpatient follow up 60 years old male pt with past medical hx of HTN, DLD, CAD S/P pci in the past on aspirin, h/o of recurrent alcoholic pancreatitis and active smoker came to ER because of chest pain that started today.    IMPRESSION:    HTN  DLD  CAD s/p PCI  Chest pain   A New onset AFib, currently in NSR   Mild to moderate mitral valve regurgitation  Small patent foramen ovale, R->L Shunt   Acute on chronic pancreatitis     RECOMMENDATIONS:  Chest pain likely Non Anginal (substernal, constant, non radiating, no releif with rest or nitro), related to acute on chronic pancreatitis   Check TSH, troponin negative, EKG without ischemic changes   [id number] (HTN, previous MI),  transition to NOAC  Continue Metoprolol, Cardizem   Continue current management   Outpatient follow up     ATTENDING NOTE TO FOLLOW 60 years old male pt with past medical hx of HTN, DLD, CAD S/P pci in the past on aspirin, h/o of recurrent alcoholic pancreatitis and active smoker came to ER because of chest pain that started today.    IMPRESSION:    HTN  DLD  CAD s/p PCI DREA to pLAD 4/18  Chest pain   A New onset AFib, currently in NSR   Mild to moderate mitral valve regurgitation  Small patent foramen ovale, R->L Shunt   Acute on chronic pancreatitis     RECOMMENDATIONS:  Chest pain likely Non Anginal (substernal, constant, non radiating, no relief with rest or nitro), relieved with morphine,  related to acute on chronic pancreatitis   Check TSH, troponin negative, EKG without ischemic changes   [id number] (HTN, previous MI),  transition to NOAC  Continue Metoprolol, Cardizem   Continue current management   Outpatient follow up     ATTENDING NOTE TO FOLLOW 60 years old male pt with past medical hx of HTN, DLD, CAD S/P pci in the past on aspirin, h/o of recurrent alcoholic pancreatitis and active smoker came to ER because of chest pain that started today.    IMPRESSION:    HTN  DLD  Atypical chest pain, in setting of CAD s/p PCI DREA to pLAD 4/18  A New onset AFib, currently in NSR   Mild to moderate mitral valve regurgitation  Small patent foramen ovale, R->L Shunt   Acute on chronic pancreatitis     RECOMMENDATIONS:  Check TSH, troponin negative, EKG without ischemic changes   [id number] (HTN, previous MI),  transition to NOAC  Continue home dose of Metoprolol, D/C Cardizem  Continue current management   Obtain Exercise nuclear Stress test     ATTENDING NOTE TO FOLLOW

## 2020-03-13 NOTE — CONSULT NOTE ADULT - ATTENDING COMMENTS
Seen / examined yesterday evening on rounds.  Above reviewed.    CAD s/p PCI LAD (4/2018)    Several comorbidities as above.    Presented with CP similar to prior angina.  Found to be in rapid AF (new diagnosis).  Subsequent spontaneous conversion NSR.    No CP / dyspnea when evaluated.  Hemodynamics stable.    EKG: AF  ECHO Reviewed: Apical hypokinesis.  EF 50-55%.  Mild to Mod MR.  Ruled out MI.    [id number] = 2    RECOMMEND:  - NST given chest pain when tachycardic.  - Stop Cardizem.  - Change Metoprolol to Toprol 100mg q24.  - Start Eliquis.  - If stress test normal, stop Ticagrelor and continue ASA (antithrombotic therapy = ASA + Eliquis).  - Cont Lipitor and Enalapril.

## 2020-03-13 NOTE — PROGRESS NOTE ADULT - SUBJECTIVE AND OBJECTIVE BOX
SUBJECTIVE:    Patient is a 60y old Male who presents with a chief complaint of chest pain (12 Mar 2020 18:30)    Currently admitted to medicine with the primary diagnosis of Chest pain     Today is hospital day 1d. This morning he is resting comfortably in bed and reports no new issues or overnight events.     PAST MEDICAL & SURGICAL HISTORY  Recurrent pancreatitis  High cholesterol  Hypertension  Myocardial infarction acute  No significant past surgical history    SOCIAL HISTORY:  Negative for smoking/alcohol/drug use.     ALLERGIES:  No Known Allergies    MEDICATIONS:  STANDING MEDICATIONS  aspirin  chewable 81 milliGRAM(s) Oral daily  atorvastatin 80 milliGRAM(s) Oral at bedtime  diltiazem    Tablet 30 milliGRAM(s) Oral every 8 hours  enalapril 10 milliGRAM(s) Oral daily  heparin  Infusion 1000 Unit(s)/Hr IV Continuous <Continuous>  influenza   Vaccine 0.5 milliLiter(s) IntraMuscular once  magnesium sulfate  IVPB 2 Gram(s) IV Intermittent once  metoprolol tartrate 50 milliGRAM(s) Oral two times a day  pantoprazole    Tablet 40 milliGRAM(s) Oral before breakfast  sodium chloride 0.9%. 1000 milliLiter(s) IV Continuous <Continuous>    PRN MEDICATIONS  morphine  - Injectable 4 milliGRAM(s) IV Push every 4 hours PRN    VITALS:   T(F): 97.1  HR: 92  BP: 99/59  RR: 18  SpO2: 95%    LABS:                        8.6    7.36  )-----------( 187      ( 13 Mar 2020 05:53 )             27.3     03-13    140  |  106  |  11  ----------------------------<  92  3.5   |  23  |  <0.5<L>    Ca    7.7<L>      13 Mar 2020 05:53  Phos  3.0     03-13  Mg     1.5     03-13    TPro  5.1<L>  /  Alb  3.0<L>  /  TBili  0.5  /  DBili  x   /  AST  15  /  ALT  6   /  AlkPhos  60  03-13    PTT - ( 13 Mar 2020 05:53 )  PTT:59.2 sec      Creatine Kinase, Serum: 50 U/L (03-13-20 @ 05:53)  Troponin T, Serum: <0.01 ng/mL (03-13-20 @ 05:53)  Troponin T, Serum: <0.01 ng/mL (03-13-20 @ 00:24)  Troponin T, Serum: <0.01 ng/mL (03-12-20 @ 21:23)  Troponin T, Serum: <0.01 ng/mL (03-12-20 @ 12:06)      CARDIAC MARKERS ( 13 Mar 2020 05:53 )  x     / <0.01 ng/mL / 50 U/L / x     / x      CARDIAC MARKERS ( 13 Mar 2020 00:24 )  x     / <0.01 ng/mL / x     / x     / x      CARDIAC MARKERS ( 12 Mar 2020 21:23 )  x     / <0.01 ng/mL / x     / x     / x      CARDIAC MARKERS ( 12 Mar 2020 12:06 )  x     / <0.01 ng/mL / x     / x     / x          RADIOLOGY:    PHYSICAL EXAM:  GEN: No acute distress  LUNGS: Clear to auscultation bilaterally   HEART: Regular  ABD: Soft, non-tender, non-distended.  EXT: NC/NC/NE/2+PP/MAHARAJ/Skin Intact.   NEURO: AAOX3    Intravenous access:   NG tube:   Jackson Catheter: SUBJECTIVE:    Patient is a 60y old Male who presents with a chief complaint of chest pain (12 Mar 2020 18:30)    Currently admitted to medicine with the primary diagnosis of Chest pain     Today is hospital day 1d. This morning he is resting comfortably in bed.  He had no events on tele overnight.  complains of epigastric/chest pain now and then; asks for pain meds frequently.    PAST MEDICAL & SURGICAL HISTORY  Recurrent pancreatitis  High cholesterol  Hypertension  Myocardial infarction acute  No significant past surgical history    ALLERGIES:  No Known Allergies    MEDICATIONS:  STANDING MEDICATIONS  aspirin  chewable 81 milliGRAM(s) Oral daily  atorvastatin 80 milliGRAM(s) Oral at bedtime  diltiazem    Tablet 30 milliGRAM(s) Oral every 8 hours  enalapril 10 milliGRAM(s) Oral daily  heparin  Infusion 1000 Unit(s)/Hr IV Continuous <Continuous>  influenza   Vaccine 0.5 milliLiter(s) IntraMuscular once  magnesium sulfate  IVPB 2 Gram(s) IV Intermittent once  metoprolol tartrate 50 milliGRAM(s) Oral two times a day  pantoprazole    Tablet 40 milliGRAM(s) Oral before breakfast  sodium chloride 0.9%. 1000 milliLiter(s) IV Continuous <Continuous>    PRN MEDICATIONS  morphine  - Injectable 4 milliGRAM(s) IV Push every 4 hours PRN    VITALS:   T(F): 97.1  HR: 92  BP: 99/59  RR: 18  SpO2: 95%    LABS:                        8.6    7.36  )-----------( 187      ( 13 Mar 2020 05:53 )             27.3     03-13    140  |  106  |  11  ----------------------------<  92  3.5   |  23  |  <0.5<L>    Ca    7.7<L>      13 Mar 2020 05:53  Phos  3.0     03-13  Mg     1.5     03-13    TPro  5.1<L>  /  Alb  3.0<L>  /  TBili  0.5  /  DBili  x   /  AST  15  /  ALT  6   /  AlkPhos  60  03-13    PTT - ( 13 Mar 2020 05:53 )  PTT:59.2 sec      Creatine Kinase, Serum: 50 U/L (03-13-20 @ 05:53)  Troponin T, Serum: <0.01 ng/mL (03-13-20 @ 05:53)  Troponin T, Serum: <0.01 ng/mL (03-13-20 @ 00:24)  Troponin T, Serum: <0.01 ng/mL (03-12-20 @ 21:23)  Troponin T, Serum: <0.01 ng/mL (03-12-20 @ 12:06)      CARDIAC MARKERS ( 13 Mar 2020 05:53 )  x     / <0.01 ng/mL / 50 U/L / x     / x      CARDIAC MARKERS ( 13 Mar 2020 00:24 )  x     / <0.01 ng/mL / x     / x     / x      CARDIAC MARKERS ( 12 Mar 2020 21:23 )  x     / <0.01 ng/mL / x     / x     / x      CARDIAC MARKERS ( 12 Mar 2020 12:06 )  x     / <0.01 ng/mL / x     / x     / x          PHYSICAL EXAM:  GEN: No acute distress  LUNGS: Clear to auscultation bilaterally   HEART: Regular  ABD: Soft, non-tender, non-distended.  EXT: NC/NC/NE/2+PP/MAHARAJ/Skin Intact.   NEURO: AAOX3

## 2020-03-14 LAB
ALBUMIN SERPL ELPH-MCNC: 2.6 G/DL — LOW (ref 3.5–5.2)
ALP SERPL-CCNC: 70 U/L — SIGNIFICANT CHANGE UP (ref 30–115)
ALT FLD-CCNC: 6 U/L — SIGNIFICANT CHANGE UP (ref 0–41)
ANION GAP SERPL CALC-SCNC: 8 MMOL/L — SIGNIFICANT CHANGE UP (ref 7–14)
APTT BLD: 41.5 SEC — HIGH (ref 27–39.2)
APTT BLD: 41.8 SEC — HIGH (ref 27–39.2)
APTT BLD: 54.6 SEC — HIGH (ref 27–39.2)
AST SERPL-CCNC: 17 U/L — SIGNIFICANT CHANGE UP (ref 0–41)
BASOPHILS # BLD AUTO: 0.05 K/UL — SIGNIFICANT CHANGE UP (ref 0–0.2)
BASOPHILS NFR BLD AUTO: 0.6 % — SIGNIFICANT CHANGE UP (ref 0–1)
BILIRUB DIRECT SERPL-MCNC: 0.2 MG/DL — SIGNIFICANT CHANGE UP (ref 0–0.2)
BILIRUB INDIRECT FLD-MCNC: 0.2 MG/DL — SIGNIFICANT CHANGE UP (ref 0.2–1.2)
BILIRUB SERPL-MCNC: 0.4 MG/DL — SIGNIFICANT CHANGE UP (ref 0.2–1.2)
BUN SERPL-MCNC: 7 MG/DL — LOW (ref 10–20)
CALCIUM SERPL-MCNC: 7.2 MG/DL — LOW (ref 8.5–10.1)
CHLORIDE SERPL-SCNC: 105 MMOL/L — SIGNIFICANT CHANGE UP (ref 98–110)
CO2 SERPL-SCNC: 23 MMOL/L — SIGNIFICANT CHANGE UP (ref 17–32)
CREAT SERPL-MCNC: <0.5 MG/DL — LOW (ref 0.7–1.5)
EOSINOPHIL # BLD AUTO: 0.23 K/UL — SIGNIFICANT CHANGE UP (ref 0–0.7)
EOSINOPHIL NFR BLD AUTO: 2.9 % — SIGNIFICANT CHANGE UP (ref 0–8)
GLUCOSE SERPL-MCNC: 110 MG/DL — HIGH (ref 70–99)
HCT VFR BLD CALC: 27.4 % — LOW (ref 42–52)
HGB BLD-MCNC: 8.5 G/DL — LOW (ref 14–18)
IMM GRANULOCYTES NFR BLD AUTO: 0.6 % — HIGH (ref 0.1–0.3)
LYMPHOCYTES # BLD AUTO: 0.91 K/UL — LOW (ref 1.2–3.4)
LYMPHOCYTES # BLD AUTO: 11.3 % — LOW (ref 20.5–51.1)
MCHC RBC-ENTMCNC: 27.2 PG — SIGNIFICANT CHANGE UP (ref 27–31)
MCHC RBC-ENTMCNC: 31 G/DL — LOW (ref 32–37)
MCV RBC AUTO: 87.8 FL — SIGNIFICANT CHANGE UP (ref 80–94)
MONOCYTES # BLD AUTO: 0.86 K/UL — HIGH (ref 0.1–0.6)
MONOCYTES NFR BLD AUTO: 10.7 % — HIGH (ref 1.7–9.3)
NEUTROPHILS # BLD AUTO: 5.92 K/UL — SIGNIFICANT CHANGE UP (ref 1.4–6.5)
NEUTROPHILS NFR BLD AUTO: 73.9 % — SIGNIFICANT CHANGE UP (ref 42.2–75.2)
NRBC # BLD: 0 /100 WBCS — SIGNIFICANT CHANGE UP (ref 0–0)
PLATELET # BLD AUTO: 216 K/UL — SIGNIFICANT CHANGE UP (ref 130–400)
POTASSIUM SERPL-MCNC: 3.2 MMOL/L — LOW (ref 3.5–5)
POTASSIUM SERPL-SCNC: 3.2 MMOL/L — LOW (ref 3.5–5)
PROT SERPL-MCNC: 4.6 G/DL — LOW (ref 6–8)
RBC # BLD: 3.12 M/UL — LOW (ref 4.7–6.1)
RBC # FLD: 19.3 % — HIGH (ref 11.5–14.5)
SODIUM SERPL-SCNC: 136 MMOL/L — SIGNIFICANT CHANGE UP (ref 135–146)
WBC # BLD: 8.02 K/UL — SIGNIFICANT CHANGE UP (ref 4.8–10.8)
WBC # FLD AUTO: 8.02 K/UL — SIGNIFICANT CHANGE UP (ref 4.8–10.8)

## 2020-03-14 PROCEDURE — 78452 HT MUSCLE IMAGE SPECT MULT: CPT | Mod: 26

## 2020-03-14 PROCEDURE — 71045 X-RAY EXAM CHEST 1 VIEW: CPT | Mod: 26

## 2020-03-14 PROCEDURE — 93018 CV STRESS TEST I&R ONLY: CPT

## 2020-03-14 PROCEDURE — 93016 CV STRESS TEST SUPVJ ONLY: CPT

## 2020-03-14 PROCEDURE — 99233 SBSQ HOSP IP/OBS HIGH 50: CPT

## 2020-03-14 RX ORDER — POTASSIUM CHLORIDE 20 MEQ
40 PACKET (EA) ORAL EVERY 4 HOURS
Refills: 0 | Status: COMPLETED | OUTPATIENT
Start: 2020-03-14 | End: 2020-03-14

## 2020-03-14 RX ADMIN — PANTOPRAZOLE SODIUM 40 MILLIGRAM(S): 20 TABLET, DELAYED RELEASE ORAL at 05:44

## 2020-03-14 RX ADMIN — HEPARIN SODIUM 11 UNIT(S)/HR: 5000 INJECTION INTRAVENOUS; SUBCUTANEOUS at 00:05

## 2020-03-14 RX ADMIN — Medication 50 MILLIGRAM(S): at 18:30

## 2020-03-14 RX ADMIN — Medication 40 MILLIEQUIVALENT(S): at 12:35

## 2020-03-14 RX ADMIN — Medication 81 MILLIGRAM(S): at 12:35

## 2020-03-14 RX ADMIN — SODIUM CHLORIDE 150 MILLILITER(S): 9 INJECTION INTRAMUSCULAR; INTRAVENOUS; SUBCUTANEOUS at 05:44

## 2020-03-14 RX ADMIN — ATORVASTATIN CALCIUM 80 MILLIGRAM(S): 80 TABLET, FILM COATED ORAL at 21:25

## 2020-03-14 RX ADMIN — SODIUM CHLORIDE 150 MILLILITER(S): 9 INJECTION INTRAMUSCULAR; INTRAVENOUS; SUBCUTANEOUS at 00:00

## 2020-03-14 RX ADMIN — Medication 50 MILLIGRAM(S): at 05:44

## 2020-03-14 RX ADMIN — Medication 40 MILLIEQUIVALENT(S): at 18:30

## 2020-03-14 RX ADMIN — Medication 30 MILLIGRAM(S): at 05:44

## 2020-03-14 NOTE — PROGRESS NOTE ADULT - SUBJECTIVE AND OBJECTIVE BOX
SUBJECTIVE:    Patient is a 60y old Male who presents with a chief complaint of chest pain (13 Mar 2020 13:51)    Currently admitted to medicine with the primary diagnosis of Chest pain     Today is hospital day 2d. This morning he is resting comfortably in bed and reports no new issues or overnight events.   Pt seen at bedside, no overnight events, pt is able to tolerate diet. Due for stress test today.       PAST MEDICAL & SURGICAL HISTORY  Recurrent pancreatitis  High cholesterol  Hypertension  Myocardial infarction acute  No significant past surgical history    SOCIAL HISTORY:  Negative for smoking/alcohol/drug use.     ALLERGIES:  No Known Allergies    MEDICATIONS:  STANDING MEDICATIONS  aspirin  chewable 81 milliGRAM(s) Oral daily  atorvastatin 80 milliGRAM(s) Oral at bedtime  heparin  Infusion 1100 Unit(s)/Hr IV Continuous <Continuous>  influenza   Vaccine 0.5 milliLiter(s) IntraMuscular once  metoprolol tartrate 50 milliGRAM(s) Oral two times a day  pantoprazole    Tablet 40 milliGRAM(s) Oral before breakfast  potassium chloride    Tablet ER 40 milliEquivalent(s) Oral every 4 hours    PRN MEDICATIONS  morphine  - Injectable 4 milliGRAM(s) IV Push every 4 hours PRN    VITALS:   T(F): 97.9  HR: 95  BP: 116/68  RR: 18  SpO2: 92%    LABS:                        8.6    7.36  )-----------( 187      ( 13 Mar 2020 05:53 )             27.3     03-14    136  |  105  |  7<L>  ----------------------------<  110<H>  3.2<L>   |  23  |  <0.5<L>    Ca    7.2<L>      14 Mar 2020 06:39  Phos  3.0     03-13  Mg     1.9     03-13    TPro  4.6<L>  /  Alb  2.6<L>  /  TBili  0.4  /  DBili  0.2  /  AST  17  /  ALT  6   /  AlkPhos  70  03-14    PTT - ( 14 Mar 2020 06:39 )  PTT:54.6 sec          CARDIAC MARKERS ( 13 Mar 2020 05:53 )  x     / <0.01 ng/mL / 50 U/L / x     / x      CARDIAC MARKERS ( 13 Mar 2020 00:24 )  x     / <0.01 ng/mL / x     / x     / x      CARDIAC MARKERS ( 12 Mar 2020 21:23 )  x     / <0.01 ng/mL / x     / x     / x      CARDIAC MARKERS ( 12 Mar 2020 12:06 )  x     / <0.01 ng/mL / x     / x     / x          RADIOLOGY:  < from: Xray Chest 1 View- PORTABLE-Routine (03.14.20 @ 05:54) >  Impression:      Bilateral lower lung field opacities.    < end of copied text >    < from: CT Angio Chest w/ IV Cont (03.12.20 @ 16:53) >    IMPRESSION:     No CTA evidence of acute pulmonary embolus.    Centrilobular emphysema. Small bilateral pleural effusions.     < end of copied text >      PHYSICAL EXAM:  GEN: No acute distress  LUNGS: Clear to auscultation bilaterally   HEART: S1/S2 present. RRR.   ABD: Soft, non-tender, non-distended. Bowel sounds present  EXT: NC/NC/NE/2+PP/MAHARAJ  NEURO: AAOX3

## 2020-03-14 NOTE — PROGRESS NOTE ADULT - ATTENDING COMMENTS
I saw and examined the patient independently. I agree with above history, physical exam and plan of care which I have reviewed and edited where appropriate with following additions.     patient reports no further chest/abdominal pain today/ tolerated clear diet well yesterday.     chest pain with h/o CAD sp PCI in past/low suspicion for acute MI:   telemonitoring- no events noted.   trops x3 neg. EKG with no ichemic changes- reviewed.  c/w asa/statin/lopressor.   Cardio eval appreciated- exercise nuclear stress test/ordered - fu .  lipid profile- acceptable range./ fu  HbA1c.    A-FIB with RVR:   rate better controlled today.  c/w lopressor/ off cardizem for BP on lower side and HR controlled on lopressor.   c/w heparin drip for AC with PTT q6hr/ will switch to NOACS once stress test is negative.  cardio eval appreciated.  TTE noted: normal LVSF , Ef >60%, mild to mod MR/small patent foramen oval  TSH- WNL.       possible mild acute on chronic alcoholic pancreatitis;  improving.  lipase mildly elevated/ no CT abdomen available- would not .   c/w IVF hydration and DC once patient tolerates regular diet well.  change diet to regular after stress test done.   c/w morphine prn for pain but not requiring a lot now.  c.w protonix.    HTN:   BP better today/not hypotensive.  off enalapril/ cardizem/ cw lopressor.     hypokalemia:   supplement K Iv 20meq x2 while NPO.  fu repeat with am labs/check mag levels.     Nicotine dependence/ETOH dependence:   c/w nicotine patch  no withdrawal symptoms.   start folate/thiamine supplements.     dvt ppx.     Progress note Handoff:   Pending: nuclear stress test/ further tolerance of diet.   Discussion: plan of care with  patient /  satisfied- all questions answered.  Disposition: home

## 2020-03-15 VITALS
HEART RATE: 97 BPM | RESPIRATION RATE: 18 BRPM | DIASTOLIC BLOOD PRESSURE: 70 MMHG | SYSTOLIC BLOOD PRESSURE: 140 MMHG | OXYGEN SATURATION: 97 %

## 2020-03-15 LAB
ALBUMIN SERPL ELPH-MCNC: 2.7 G/DL — LOW (ref 3.5–5.2)
ALP SERPL-CCNC: 71 U/L — SIGNIFICANT CHANGE UP (ref 30–115)
ALT FLD-CCNC: 8 U/L — SIGNIFICANT CHANGE UP (ref 0–41)
ANION GAP SERPL CALC-SCNC: 10 MMOL/L — SIGNIFICANT CHANGE UP (ref 7–14)
APTT BLD: 85.6 SEC — CRITICAL HIGH (ref 27–39.2)
AST SERPL-CCNC: 22 U/L — SIGNIFICANT CHANGE UP (ref 0–41)
BASOPHILS # BLD AUTO: 0.05 K/UL — SIGNIFICANT CHANGE UP (ref 0–0.2)
BASOPHILS NFR BLD AUTO: 0.7 % — SIGNIFICANT CHANGE UP (ref 0–1)
BILIRUB SERPL-MCNC: 0.4 MG/DL — SIGNIFICANT CHANGE UP (ref 0.2–1.2)
BUN SERPL-MCNC: <3 MG/DL — LOW (ref 10–20)
CALCIUM SERPL-MCNC: 7.8 MG/DL — LOW (ref 8.5–10.1)
CHLORIDE SERPL-SCNC: 107 MMOL/L — SIGNIFICANT CHANGE UP (ref 98–110)
CO2 SERPL-SCNC: 22 MMOL/L — SIGNIFICANT CHANGE UP (ref 17–32)
CREAT SERPL-MCNC: 0.5 MG/DL — LOW (ref 0.7–1.5)
EOSINOPHIL # BLD AUTO: 0.33 K/UL — SIGNIFICANT CHANGE UP (ref 0–0.7)
EOSINOPHIL NFR BLD AUTO: 4.6 % — SIGNIFICANT CHANGE UP (ref 0–8)
GLUCOSE SERPL-MCNC: 114 MG/DL — HIGH (ref 70–99)
HCT VFR BLD CALC: 29.1 % — LOW (ref 42–52)
HGB BLD-MCNC: 8.9 G/DL — LOW (ref 14–18)
IMM GRANULOCYTES NFR BLD AUTO: 0.4 % — HIGH (ref 0.1–0.3)
LYMPHOCYTES # BLD AUTO: 0.95 K/UL — LOW (ref 1.2–3.4)
LYMPHOCYTES # BLD AUTO: 13.2 % — LOW (ref 20.5–51.1)
MAGNESIUM SERPL-MCNC: 1.7 MG/DL — LOW (ref 1.8–2.4)
MCHC RBC-ENTMCNC: 26.3 PG — LOW (ref 27–31)
MCHC RBC-ENTMCNC: 30.6 G/DL — LOW (ref 32–37)
MCV RBC AUTO: 85.8 FL — SIGNIFICANT CHANGE UP (ref 80–94)
MONOCYTES # BLD AUTO: 0.81 K/UL — HIGH (ref 0.1–0.6)
MONOCYTES NFR BLD AUTO: 11.3 % — HIGH (ref 1.7–9.3)
NEUTROPHILS # BLD AUTO: 5.01 K/UL — SIGNIFICANT CHANGE UP (ref 1.4–6.5)
NEUTROPHILS NFR BLD AUTO: 69.8 % — SIGNIFICANT CHANGE UP (ref 42.2–75.2)
NRBC # BLD: 0 /100 WBCS — SIGNIFICANT CHANGE UP (ref 0–0)
PLATELET # BLD AUTO: 234 K/UL — SIGNIFICANT CHANGE UP (ref 130–400)
POTASSIUM SERPL-MCNC: 4.1 MMOL/L — SIGNIFICANT CHANGE UP (ref 3.5–5)
POTASSIUM SERPL-SCNC: 4.1 MMOL/L — SIGNIFICANT CHANGE UP (ref 3.5–5)
PROT SERPL-MCNC: 5 G/DL — LOW (ref 6–8)
RBC # BLD: 3.39 M/UL — LOW (ref 4.7–6.1)
RBC # FLD: 18.9 % — HIGH (ref 11.5–14.5)
SODIUM SERPL-SCNC: 139 MMOL/L — SIGNIFICANT CHANGE UP (ref 135–146)
WBC # BLD: 7.18 K/UL — SIGNIFICANT CHANGE UP (ref 4.8–10.8)
WBC # FLD AUTO: 7.18 K/UL — SIGNIFICANT CHANGE UP (ref 4.8–10.8)

## 2020-03-15 PROCEDURE — 99239 HOSP IP/OBS DSCHRG MGMT >30: CPT

## 2020-03-15 PROCEDURE — 93010 ELECTROCARDIOGRAM REPORT: CPT

## 2020-03-15 RX ORDER — FOLIC ACID 0.8 MG
1 TABLET ORAL
Qty: 30 | Refills: 3
Start: 2020-03-15 | End: 2020-07-12

## 2020-03-15 RX ORDER — MAGNESIUM OXIDE 400 MG ORAL TABLET 241.3 MG
400 TABLET ORAL
Refills: 0 | Status: DISCONTINUED | OUTPATIENT
Start: 2020-03-15 | End: 2020-03-15

## 2020-03-15 RX ORDER — APIXABAN 2.5 MG/1
5 TABLET, FILM COATED ORAL ONCE
Refills: 0 | Status: COMPLETED | OUTPATIENT
Start: 2020-03-15 | End: 2020-03-15

## 2020-03-15 RX ORDER — PANTOPRAZOLE SODIUM 20 MG/1
1 TABLET, DELAYED RELEASE ORAL
Qty: 30 | Refills: 2
Start: 2020-03-15 | End: 2020-06-12

## 2020-03-15 RX ORDER — TICAGRELOR 90 MG/1
1 TABLET ORAL
Qty: 0 | Refills: 0 | DISCHARGE

## 2020-03-15 RX ORDER — METOPROLOL TARTRATE 50 MG
50 TABLET ORAL ONCE
Refills: 0 | Status: COMPLETED | OUTPATIENT
Start: 2020-03-15 | End: 2020-03-15

## 2020-03-15 RX ORDER — METOPROLOL TARTRATE 50 MG
1 TABLET ORAL
Qty: 90 | Refills: 3
Start: 2020-03-15 | End: 2020-07-12

## 2020-03-15 RX ORDER — THIAMINE MONONITRATE (VIT B1) 100 MG
1 TABLET ORAL
Qty: 30 | Refills: 0
Start: 2020-03-15 | End: 2020-04-13

## 2020-03-15 RX ORDER — METOPROLOL TARTRATE 50 MG
1 TABLET ORAL
Qty: 0 | Refills: 0 | DISCHARGE

## 2020-03-15 RX ORDER — APIXABAN 2.5 MG/1
1 TABLET, FILM COATED ORAL
Qty: 30 | Refills: 3
Start: 2020-03-15 | End: 2020-07-12

## 2020-03-15 RX ADMIN — Medication 50 MILLIGRAM(S): at 05:29

## 2020-03-15 RX ADMIN — Medication 50 MILLIGRAM(S): at 13:23

## 2020-03-15 RX ADMIN — APIXABAN 5 MILLIGRAM(S): 2.5 TABLET, FILM COATED ORAL at 13:23

## 2020-03-15 RX ADMIN — Medication 81 MILLIGRAM(S): at 11:32

## 2020-03-15 RX ADMIN — MAGNESIUM OXIDE 400 MG ORAL TABLET 400 MILLIGRAM(S): 241.3 TABLET ORAL at 11:32

## 2020-03-15 RX ADMIN — PANTOPRAZOLE SODIUM 40 MILLIGRAM(S): 20 TABLET, DELAYED RELEASE ORAL at 05:30

## 2020-03-15 RX ADMIN — MAGNESIUM OXIDE 400 MG ORAL TABLET 400 MILLIGRAM(S): 241.3 TABLET ORAL at 08:56

## 2020-03-15 NOTE — PROGRESS NOTE ADULT - ATTENDING COMMENTS
I saw and examined the patient independently. I agree with above history, physical exam and plan of care which I have reviewed and edited where appropriate with following additions.    patient reports no further chest/ abdominal pain/ tolerating diet well.     chest pain with h/o CAD sp PCI in past/low suspicion for acute MI:   telemonitoring- no events noted.   trops x3 neg. EKG with no ichemic changes  c/w asa/statin/lopressor.   Cardio eval appreciated- exercise nuclear stress test /reversible defect but cardio suggest no further ischemic cohne as patient had this finding in the past.  lipid profile- acceptable range./ fu  HbA1c.    A-FIB with RVR:   rate better controlled today.  c/w lopressor/ off cardizem for BP on lower side and HR controlled on lopressor.   switch to oral eliquis/ SW to find out Co-pay.   cardio eval appreciated/ suggest would benefit from ablation as still in A- fib but can be done as OP.   TTE noted: normal LVSF , Ef >60%, mild to mod MR/small patent foramen oval  TSH- WNL.       possible mild acute on chronic alcoholic pancreatitis;  improved/ tolerating diet well.   c.w protonix.    HTN:   BP better today/not hypotensive.  continue holding enalapril and cw lopressor as BP is on low side.     hypokalemia:   improved with repletion.     Nicotine dependence/ETOH dependence in the past.    c/w nicotine patch  no withdrawal symptoms.   start folate/thiamine supplements.     dvt ppx.     Progress note Handoff:   Pending: eliquis [employer] - pay approval from [employer].   Discussion: plan of care with  patient /  satisfied- all questions answered.  Disposition: home .

## 2020-03-15 NOTE — DISCHARGE NOTE PROVIDER - HOSPITAL COURSE
60y old Male who presents with a chief complaint of chest pain found to have acute onset Afib new for which he was admitted to medicine.    He was also found to have acute on chronic pancreatitis managed conservatively which is resolved now and  tolerating diet.    HE still goes in and out of afib but he is adamant to leave.    Case discussed with cardiology and was recommended to discharge on betablocker and eliquis and follow up with cardiology  within a week.

## 2020-03-15 NOTE — DISCHARGE NOTE NURSING/CASE MANAGEMENT/SOCIAL WORK - NSDCPEEMAIL_GEN_ALL_CORE
[facility] for Tobacco Control email [email or web address]

## 2020-03-15 NOTE — DISCHARGE NOTE PROVIDER - NSDCCPCAREPLAN_GEN_ALL_CORE_FT
PRINCIPAL DISCHARGE DIAGNOSIS  Diagnosis: Chest pain  Assessment and Plan of Treatment: Your chest pain was likely due to a new onset atrial fibrillation. You are still in atrial fibrillation. please continue taking lopressor and eliquis and follow up with cardiology Dr Epperson within a week for further management.

## 2020-03-15 NOTE — DISCHARGE NOTE PROVIDER - CARE PROVIDER_API CALL
Cedric Epperson)  Cardiovascular Disease; Internal Medicine  [address]  [place] [address]  Phone: [phone]  Fax: [phone]  Follow Up Time: 1 week    Vu Galaviz)  Internal Medicine  [address], [address]  [place] [address]  Phone: [phone]  Fax: [phone]  Follow Up Time: 1 week

## 2020-03-15 NOTE — DISCHARGE NOTE NURSING/CASE MANAGEMENT/SOCIAL WORK - NSDCPEWEB_GEN_ALL_CORE
[facility] for Tobacco Control website --- [email or web address] --- [email or web address]

## 2020-03-15 NOTE — PROGRESS NOTE ADULT - SUBJECTIVE AND OBJECTIVE BOX
SUBJECTIVE:    Patient is a 60y old Male who presents with a chief complaint of chest pain (14 Mar 2020 09:36)    Currently admitted to medicine with the primary diagnosis of Chest pain     Today is hospital day 3d.   This morning he is resting comfortably in bed and reports no new issues or overnight events.     PAST MEDICAL & SURGICAL HISTORY  Recurrent pancreatitis  High cholesterol  Hypertension  Myocardial infarction acute  No significant past surgical history    ALLERGIES:  No Known Allergies    MEDICATIONS:  STANDING MEDICATIONS  aspirin  chewable 81 milliGRAM(s) Oral daily  atorvastatin 80 milliGRAM(s) Oral at bedtime  heparin  Infusion 1100 Unit(s)/Hr IV Continuous <Continuous>  influenza   Vaccine 0.5 milliLiter(s) IntraMuscular once  metoprolol tartrate 50 milliGRAM(s) Oral two times a day  pantoprazole    Tablet 40 milliGRAM(s) Oral before breakfast    PRN MEDICATIONS  morphine  - Injectable 4 milliGRAM(s) IV Push every 4 hours PRN    VITALS:   T(F): 97  HR: 91  BP: 137/84  RR: 18  SpO2: --    LABS:                        8.9    7.18  )-----------( 234      ( 15 Mar 2020 06:53 )             29.1     03-15    139  |  107  |  <3<L>  ----------------------------<  114<H>  4.1   |  22  |  0.5<L>    Ca    7.8<L>      15 Mar 2020 06:53  Mg     1.7     03-15    TPro  5.0<L>  /  Alb  2.7<L>  /  TBili  0.4  /  DBili  x   /  AST  22  /  ALT  8   /  AlkPhos  71  03-15    PTT - ( 14 Mar 2020 20:33 )  PTT:41.8 sec      PHYSICAL EXAM:  GEN: No acute distress  LUNGS: Clear to auscultation bilaterally   HEART: Regular  ABD: Soft, non-tender, non-distended.  EXT: no edema  NEURO: AAOX3

## 2020-03-15 NOTE — DISCHARGE NOTE PROVIDER - NSDCMRMEDTOKEN_GEN_ALL_CORE_FT
aspirin 81 mg oral tablet, chewable: 1 tab(s) orally once a day  atorvastatin 80 mg oral tablet: 1 tab(s) orally once a day  Brilinta (ticagrelor) 90 mg oral tablet: 1 tab(s) orally 2 times a day  Eliquis 5 mg oral tablet: 1 tab(s) orally once a day   enalapril 2.5 mg oral tablet: 1 tab(s) orally once a day  folic acid 1 mg oral tablet: 1 tab(s) orally once a day   Metoprolol Succinate ER 50 mg oral tablet, extended release: 1 tab(s) orally once a day  pantoprazole 40 mg oral delayed release tablet: 1 tab(s) orally once a day (before a meal)  thiamine 100 mg oral tablet: 1 tab(s) orally once a day

## 2020-03-15 NOTE — DISCHARGE NOTE PROVIDER - PROVIDER TOKENS
PROVIDER:[TOKEN:[[id number]],FOLLOWUP:[1 week]],PROVIDER:[TOKEN:[[id number]],FOLLOWUP:[1 week]]

## 2020-03-15 NOTE — PROGRESS NOTE ADULT - ASSESSMENT
#) Paroxysmal Afib and Chest pain.   continue metoprolol  stress test suggestive of reproducible ischemia  follow up cardiology    #) Acute on chronic pancreatitis  continue fluids  advance diet as tolerated  follow up CBC and BMP    #) Alcohol dependence:  no signs of withdrawal  will start on thiamine and folate    #) Anemia:   stable    DLD/CAD: aspirin and statin; beta blocker    GI ppx: ppi    DVT ppx: on heparin ggt, can change to NOAC if no intervention    Dispo: pending
Pt is a 59 yo M comes with chest pain. Found to be in A fib RVR on admission.     #) Paroxysmal Afib and chest pain.   - r/o ACS- Trop serial negative.   - S/p cardizem- presently in NSR, HD stable.   - Seen by cardiology- Dc cardizem, c/w metoprolol  - TSH normal. follow up echo  - Exercise nuclear stress test as per cardio.   - on Heparin ggt; follow up PTT, switch to NOAC based on Stress test results.       #) Acute on chronic pancreatitis  lipase and amylase elevated, c.w iv fluids for now.   No abdominal pain.  tolerating diet- advance to soft    #) Alcohol dependence:  no signs of withdrawal  will start on thiamine and folate      Anemia:   normocytic anemia  no active GI bleed  get fecal occult blood  type and screen active      DLD/CAD: aspirin and statin; beta blocker    GI ppx: ppi    DVT ppx: on heparin ggt    Dispo: pending
Paroxysmal Afib:  Patient converted after intravenous Cardizem.  patient in sinus rhythm now  patient on cardizem 30 mg every 6 hr and metoprolol 25 mg q 12 hr  cardiac enzyme negative  ECHO pending  on Heparin ggt; follow up PTT at noon and at night and in am, likely switch to NOAC if no further workup  cardiology consult for ischemic workup and further management recommendation.    Acute on chronic pancreatitis:  lipase and amylase elevated  abdominal pain resolving  on IV fluids NS @ 150  advance diet to clears if tolerated  CBC and BMP at 4 pm and in am    Alcohol dependence:  no signs of withdrawal  will start on thiamine and folate    Anemia:   normocytic anemia  no active GI bleed  get fecal occult blood  type and screen active  Gastroenterology consult    DLD/CAD: aspirin and statin; beta blocker    GI ppx: ppi    DVT ppx: on heparin ggt    Dispo: pending

## 2020-03-15 NOTE — DISCHARGE NOTE NURSING/CASE MANAGEMENT/SOCIAL WORK - PATIENT PORTAL LINK FT
You can access the FollowMyHealth Patient Portal offered by [facility] by registering at the following website: [email or web address]. By joining [employer]’s FollowMyHealth portal, you will also be able to view your health information using other applications (apps) compatible with our system.

## 2020-03-15 NOTE — DISCHARGE NOTE PROVIDER - CARE PROVIDERS DIRECT ADDRESSES
[email or web address]

## 2020-03-19 DIAGNOSIS — K85.20 ALCOHOL INDUCED ACUTE PANCREATITIS WITHOUT NECROSIS OR INFECTION: ICD-10-CM

## 2020-03-19 DIAGNOSIS — I25.10 ATHEROSCLEROTIC HEART DISEASE OF NATIVE CORONARY ARTERY WITHOUT ANGINA PECTORIS: ICD-10-CM

## 2020-03-19 DIAGNOSIS — K21.9 GASTRO-ESOPHAGEAL REFLUX DISEASE WITHOUT ESOPHAGITIS: ICD-10-CM

## 2020-03-19 DIAGNOSIS — D64.9 ANEMIA, UNSPECIFIED: ICD-10-CM

## 2020-03-19 DIAGNOSIS — R07.9 CHEST PAIN, UNSPECIFIED: ICD-10-CM

## 2020-03-19 DIAGNOSIS — E78.5 HYPERLIPIDEMIA, UNSPECIFIED: ICD-10-CM

## 2020-03-19 DIAGNOSIS — F10.20 ALCOHOL DEPENDENCE, UNCOMPLICATED: ICD-10-CM

## 2020-03-19 DIAGNOSIS — I10 ESSENTIAL (PRIMARY) HYPERTENSION: ICD-10-CM

## 2020-03-19 DIAGNOSIS — Z95.5 PRESENCE OF CORONARY ANGIOPLASTY IMPLANT AND GRAFT: ICD-10-CM

## 2020-03-19 DIAGNOSIS — F17.210 NICOTINE DEPENDENCE, CIGARETTES, UNCOMPLICATED: ICD-10-CM

## 2020-03-19 DIAGNOSIS — I25.2 OLD MYOCARDIAL INFARCTION: ICD-10-CM

## 2020-03-19 DIAGNOSIS — E87.6 HYPOKALEMIA: ICD-10-CM

## 2020-03-19 DIAGNOSIS — I48.0 PAROXYSMAL ATRIAL FIBRILLATION: ICD-10-CM

## 2020-03-19 DIAGNOSIS — K86.0 ALCOHOL-INDUCED CHRONIC PANCREATITIS: ICD-10-CM

## 2020-03-22 LAB
AMPHET UR-MCNC: NEGATIVE — SIGNIFICANT CHANGE UP
BARBITURATES, URINE.: NEGATIVE — SIGNIFICANT CHANGE UP
BENZODIAZ UR-MCNC: NEGATIVE — SIGNIFICANT CHANGE UP
COCAINE METAB.OTHER UR-MCNC: NEGATIVE — SIGNIFICANT CHANGE UP
CREATININE, URINE THERAPEUTIC: 211.3 MG/DL — SIGNIFICANT CHANGE UP
METHADONE UR-MCNC: NEGATIVE — SIGNIFICANT CHANGE UP
METHAQUALONE UR QL: NEGATIVE — SIGNIFICANT CHANGE UP
METHAQUALONE UR-MCNC: NEGATIVE — SIGNIFICANT CHANGE UP
OPIATES UR-MCNC: SIGNIFICANT CHANGE UP
PCP UR-MCNC: NEGATIVE — SIGNIFICANT CHANGE UP
PROPOXYPH UR QL: NEGATIVE — SIGNIFICANT CHANGE UP
THC UR QL: SIGNIFICANT CHANGE UP

## 2020-09-26 NOTE — ED ADULT NURSE NOTE - CHIEF COMPLAINT
Addended by: Harrison Thakkar on: 9/26/2020 12:39 PM     Modules accepted: Orders The patient is a 60y Male complaining of shortness of breath.

## 2024-08-19 NOTE — PATIENT PROFILE ADULT - DO YOU WANT TO COMPLETE THE HCP AND NAME A HEALTH CARE AGENT
Patient Education       Well Child Exam 3 Years   About this topic   Your child's 3-year well child exam is a visit with the doctor to check your child's health. The doctor measures your child's weight, height, and head size. The doctor plots these numbers on a growth curve. The growth curve gives a picture of your child's growth at each visit. The doctor may listen to your child's heart, lungs, and belly. Your doctor will do a full exam of your child from the head to the toes.  Your child may also need shots or blood tests during this visit.  General   Growth and Development   Your doctor will ask you how your child is developing. The doctor will focus on the skills that most children your child's age are expected to do. During this time of your child's life, here are some things you can expect.  Movement - Your child may:  Pedal a tricycle  Go up and down stairs, one foot at a time  Jump with both feet  Be able to wash and dry hands  Dress and undress self with little help  Throw, catch and kick a ball  Run easily  Be able to balance on one foot  Hearing, seeing, and talking - Your child will likely:  Know first and last name, as well as age  Speak clearly so others can understand  Speak in short sentence  Ask why often  Turn pages of a book  Be able to retell a story  Count 3 objects  Feelings and behavior - Your child will likely:  Begin to take turns while playing  Enjoy being around other children. Show emotions like caring or affection.  Play make-believe  Test rules. Help your child learn what the rules are by having rules that do not change. Make your rules the same all the time. Use a short time out to discipline your toddler.  Feeding - Your child:  Can start to drink lowfat or fat-free milk. Limit your child to 2 to 3 cups (480 to 720 mL) of milk each day.  Will be eating 3 meals and 1 to 2 snacks a day. Make sure to give your child the right size portions and healthy choices.  Should be given a  variety of healthy foods and textures. Let your child decide how much to eat.  Should have no more than 4 ounces (120 mL) of fruit juice a day. Do not give your child soda.  May be able to start brushing teeth. You will still need to help as well. Start using a pea-sized amount of toothpaste with fluoride. Brush your child's teeth 2 to 3 times each day.  Sleep - Your child:  May be ready to sleep in a bed with or without side rails  Is likely sleeping about 8 to 10 hours in a row at night. Your child may still take one nap during the day.  May have bad dreams or wake up at night. Try to have the same routine before bedtime.  Potty training - Your child is often potty trained or getting ready for potty training by age 3. Encourage potty training by:  Having a potty chair in the bathroom next to the toilet  Using lots of praise and stickers or a chart as rewards when your child is able to go on the potty instead of in a diaper  Reading books, singing songs, or watching a movie about using the potty  Dressing your child in clothes that are easy to pull up and down  Understanding that accidents will happen. Do not punish or scold your child if an accident happens.  Shots - It is important for your child to get shots on time. This protects your child from very serious illnesses like brain or lung infections.  Your child may need some shots if they were missed earlier. Talk with the doctor to make sure your child is up to date on shots.  Get your child a flu shot every year.  Help for Parents   Play with your child.  Go outside as often as you can. Throw and kick a ball. Be sure your child is safe when playing near a street or around water.  Visit playgrounds. Make sure the equipment and ground is safe and well cared for.  Make a game out of household chores. Sort clothes by color or size. Race to  toys.  Give your child a tricycle or bicycle to ride. Make sure your child wears a helmet when using anything with  wheels like scooters, skates, skateboard, bike, etc.  Read to your child. Have your child tell the story back to you. Talk and sing to your child.  Give your child paper, safe scissors, gluesticks, and other craft supplies. Help your child make a project.  Here are some things you can do to help keep your child safe and healthy.  Schedule a dentist appointment for your child.  Put sunscreen with a SPF30 or higher on your child at least 15 to 30 minutes before going outside. Put more sunscreen on after about 2 hours.  Do not allow anyone to smoke in your home or around your child.  Have the right size car seat for your child and use it every time your child is in the car. Seats with a harness are safer than just a booster seat with a belt. Keep your toddler in a rear facing car seat until they reach the maximum height or weight requirement for safety by the seat .  Take extra care around water. Never leave your child in the tub or pool alone. Make sure your child cannot get to pools or spas.  Never leave your child alone. Do not leave your child in the car or at home alone, even for a few minutes.  Protect your child from gun injuries. If you have a gun, use a trigger lock. Keep the gun locked up and the bullets kept in a separate place.  Limit screen time for children to 1 hour per day. This means TV, phones, computers, tablets, and video games.  Parents need to think about:  Enrolling your child in  or having time for your child to play with other children the same age  How to encourage your child to be physically active  Talking to your child about strangers, unwanted touch, and keeping private parts safe  Having emergency numbers, including poison control, posted on or near the phone  Taking a CPR class  The next well child visit will most likely be when your child is 4 years old. At this visit your doctor may:  Do a full check up on your child  Talk about limiting screen time for your child,  how well your child is eating, and how to promote physical activity  Talk about discipline and how to correct your child  Talk about getting your child ready for school  When do I need to call the doctor?   Fever of 100.4°F (38°C) or higher  Is not showing signs of being ready to potty train  Has trouble with constipation  Has trouble speaking or following simple instructions  You are worried about your child's development  Where can I learn more?   Centers for Disease Control and Prevention  https://www.cdc.gov/ncbddd/actearly/milestones/milestones-3yr.html   Kids Health  https://kidshealth.org/en/parents/checkup-3yrs.html?ref=search   Last Reviewed Date   2021  Consumer Information Use and Disclaimer   This information is not specific medical advice and does not replace information you receive from your health care provider. This is only a brief summary of general information. It does NOT include all information about conditions, illnesses, injuries, tests, procedures, treatments, therapies, discharge instructions or life-style choices that may apply to you. You must talk with your health care provider for complete information about your health and treatment options. This information should not be used to decide whether or not to accept your health care providers advice, instructions or recommendations. Only your health care provider has the knowledge and training to provide advice that is right for you.  Copyright   Copyright © 2021 Up[employer]Date, Inc. and its affiliates and/or licensors. All rights reserved.    A child who is at least 2 years old and has outgrown the rear facing seat will be restrained in a forward facing restraint system with an internal harness.  If you have an active [facility] account, please look for your well child questionnaire to come to your [facility] account before your next well child visit.   no